# Patient Record
Sex: MALE | Race: WHITE | Employment: OTHER | URBAN - METROPOLITAN AREA
[De-identification: names, ages, dates, MRNs, and addresses within clinical notes are randomized per-mention and may not be internally consistent; named-entity substitution may affect disease eponyms.]

---

## 2017-01-18 ENCOUNTER — HOSPITAL ENCOUNTER (OUTPATIENT)
Dept: GENERAL RADIOLOGY | Age: 72
Discharge: HOME | End: 2017-01-18
Attending: FAMILY MEDICINE
Payer: MEDICARE

## 2017-01-18 DIAGNOSIS — R05.9 COUGH: ICD-10-CM

## 2017-01-18 DIAGNOSIS — R53.83 FATIGUE: ICD-10-CM

## 2017-01-18 PROCEDURE — 71020 XR CHEST PA + LAT CHEST (CPT=71020): CPT

## 2017-01-25 ENCOUNTER — TELEPHONE (OUTPATIENT)
Dept: FAMILY MEDICINE CLINIC | Facility: CLINIC | Age: 72
End: 2017-01-25

## 2017-01-25 NOTE — TELEPHONE ENCOUNTER
Informed Pt of Dr. Cris Houser phone number. Pt will schedule appt.  Christopher Martinez, 01/25/2017, 3:12 PM

## 2017-02-07 ENCOUNTER — TELEPHONE (OUTPATIENT)
Dept: FAMILY MEDICINE CLINIC | Facility: CLINIC | Age: 72
End: 2017-02-07

## 2017-02-07 PROBLEM — R53.81 GENERAL BODY DETERIORATION: Status: ACTIVE | Noted: 2017-01-18

## 2017-02-07 PROBLEM — R05.9 COUGH: Status: ACTIVE | Noted: 2017-01-18

## 2017-02-07 PROBLEM — R22.2 MASS IN CHEST: Status: ACTIVE | Noted: 2017-01-19

## 2017-02-07 RX ORDER — DILTIAZEM HYDROCHLORIDE 180 MG/1
1 CAPSULE, COATED, EXTENDED RELEASE ORAL DAILY
COMMUNITY
Start: 2015-06-09 | End: 2017-06-28 | Stop reason: ALTCHOICE

## 2017-02-22 ENCOUNTER — TELEPHONE (OUTPATIENT)
Dept: FAMILY MEDICINE CLINIC | Facility: CLINIC | Age: 72
End: 2017-02-22

## 2017-02-22 RX ORDER — FLUOROURACIL 50 MG/G
1 CREAM TOPICAL AS DIRECTED
Refills: 1 | COMMUNITY
Start: 2017-02-16 | End: 2017-09-02 | Stop reason: ALTCHOICE

## 2017-06-28 ENCOUNTER — HOSPITAL ENCOUNTER (OUTPATIENT)
Dept: GENERAL RADIOLOGY | Age: 72
Discharge: HOME | End: 2017-06-28
Attending: FAMILY MEDICINE
Payer: MEDICARE

## 2017-06-28 ENCOUNTER — LAB ENCOUNTER (OUTPATIENT)
Dept: LAB | Age: 72
End: 2017-06-28
Attending: FAMILY MEDICINE
Payer: MEDICARE

## 2017-06-28 ENCOUNTER — OFFICE VISIT (OUTPATIENT)
Dept: FAMILY MEDICINE CLINIC | Facility: CLINIC | Age: 72
End: 2017-06-28

## 2017-06-28 VITALS
BODY MASS INDEX: 26.74 KG/M2 | DIASTOLIC BLOOD PRESSURE: 74 MMHG | HEIGHT: 68.5 IN | RESPIRATION RATE: 16 BRPM | HEART RATE: 80 BPM | TEMPERATURE: 98 F | SYSTOLIC BLOOD PRESSURE: 134 MMHG | WEIGHT: 178.5 LBS

## 2017-06-28 DIAGNOSIS — R91.1 LUNG NODULE: ICD-10-CM

## 2017-06-28 DIAGNOSIS — I35.9 AORTIC VALVE DISORDER: ICD-10-CM

## 2017-06-28 DIAGNOSIS — J98.4 SINGLE CYST OF LUNG: ICD-10-CM

## 2017-06-28 DIAGNOSIS — K40.91 UNILATERAL RECURRENT INGUINAL HERNIA WITHOUT OBSTRUCTION OR GANGRENE: ICD-10-CM

## 2017-06-28 DIAGNOSIS — M15.9 PRIMARY OSTEOARTHRITIS INVOLVING MULTIPLE JOINTS: ICD-10-CM

## 2017-06-28 DIAGNOSIS — C43.9 MELANOMA OF SKIN (HCC): ICD-10-CM

## 2017-06-28 DIAGNOSIS — Z98.890 STATUS POST MITRAL VALVE REPAIR: ICD-10-CM

## 2017-06-28 DIAGNOSIS — N40.1 BENIGN NON-NODULAR PROSTATIC HYPERPLASIA WITH LOWER URINARY TRACT SYMPTOMS: ICD-10-CM

## 2017-06-28 DIAGNOSIS — I48.0 PAROXYSMAL ATRIAL FIBRILLATION (HCC): ICD-10-CM

## 2017-06-28 DIAGNOSIS — Z00.00 ENCOUNTER FOR ANNUAL HEALTH EXAMINATION: Primary | ICD-10-CM

## 2017-06-28 PROBLEM — M19.90 OSTEOARTHRITIS: Status: ACTIVE | Noted: 2017-06-28

## 2017-06-28 PROCEDURE — G0439 PPPS, SUBSEQ VISIT: HCPCS | Performed by: FAMILY MEDICINE

## 2017-06-28 PROCEDURE — 71020 XR CHEST PA + LAT CHEST (CPT=71020): CPT | Performed by: FAMILY MEDICINE

## 2017-06-28 PROCEDURE — 84550 ASSAY OF BLOOD/URIC ACID: CPT

## 2017-06-28 PROCEDURE — 80053 COMPREHEN METABOLIC PANEL: CPT

## 2017-06-28 PROCEDURE — 36415 COLL VENOUS BLD VENIPUNCTURE: CPT

## 2017-06-28 PROCEDURE — 85025 COMPLETE CBC W/AUTO DIFF WBC: CPT

## 2017-06-28 PROCEDURE — 84443 ASSAY THYROID STIM HORMONE: CPT

## 2017-06-28 PROCEDURE — 99214 OFFICE O/P EST MOD 30 MIN: CPT | Performed by: FAMILY MEDICINE

## 2017-06-28 RX ORDER — METOPROLOL SUCCINATE 25 MG/1
25 TABLET, EXTENDED RELEASE ORAL 2 TIMES DAILY
COMMUNITY
Start: 2017-06-02 | End: 2019-12-11

## 2017-06-28 NOTE — PROGRESS NOTES
[facility] [place]  PROGRESS NOTE  Chief Complaint:   Patient presents with:  Physical      HPI:   This is a 66year old male coming in for his annual Medicare wellness exam.    He notes that he just had more muscle weakness that he had before. Rivaroxaban (XARELTO) 20 MG Oral Tab Take 1 tablet by mouth daily. Disp:  Rfl:       Counseling given: Not Answered       REVIEW OF SYSTEMS:   CONSTITUTIONAL: His weight is not changed. He has not had any fever or chills.   He just has diffuse muscle wea apparent distress. HEENT:  Head:  Normocephalic, atraumatic Eyes: EOMI, PERRLA, no scleral icterus, conjunctivae clear bilaterally, no eye discharge Ears: External normal. Nose: patent, no nasal discharge Throat:  No tonsillar erythema or exudate.   Mouth: labs today including a CBC.  - CBC WITH DIFFERENTIAL WITH PLATELET; Future  - COMP METABOLIC PANEL (14); Future  - ASSAY, THYROID STIM HORMONE; Future  - URIC ACID, SERUM; Future    5.  Unilateral recurrent inguinal hernia without obstruction or gangrene  T Elevated prostate specific antigen (PSA)     General body deterioration     Routine general medical examination at a health care facility     Inguinal hernia     Mass in chest     Lung nodule     Melanoma of skin ([facility] [address]     Mitral valve disorder     Special

## 2017-06-28 NOTE — PATIENT INSTRUCTIONS
Recommended Websites for Advanced Directives    [email or web address].no. org/publications/Documents/personal_dec. pdf  An information packet, including necessary form from the [address] website. http://www. [place]state. [place]/public/books/adv

## 2017-06-30 ENCOUNTER — TELEPHONE (OUTPATIENT)
Dept: FAMILY MEDICINE CLINIC | Facility: CLINIC | Age: 72
End: 2017-06-30

## 2017-06-30 NOTE — TELEPHONE ENCOUNTER
----- Message from Nikita Lawler sent at 6/30/2017  3:33 PM CDT -----  R/C    Please call [phone].

## 2017-06-30 NOTE — TELEPHONE ENCOUNTER
----- Message from Cherri Fregoso MD sent at 6/30/2017 12:53 PM CDT -----  Please call Wallace Jeans. His labs look good. His blood sugar is normal.  His thyroid is normal.   His uric acid is normal.. His blood count is normal.  This is all good news.

## 2017-06-30 NOTE — TELEPHONE ENCOUNTER
Patient informed of below. Expressed understanding. Requesting copy of CXR/CT Scan and Labs left at  for .   Deandre Blevins, 06/30/17, 4:03 PM

## 2017-06-30 NOTE — TELEPHONE ENCOUNTER
Sorry - did not mention that in my note. The chest x-ray shows that the cyst has grown some since the last chest x-ray. Next step is to have the follow-up CT done with Dr Yelena Roy next month.

## 2017-06-30 NOTE — TELEPHONE ENCOUNTER
Informed of this blood work results. Pt expressed understanding and thanks. Also pt would like to know if Dr. Raphael Tafoya compared the chest xray from Wednesday to 5 months ago. Please advise.

## 2017-09-02 ENCOUNTER — OFFICE VISIT (OUTPATIENT)
Dept: FAMILY MEDICINE CLINIC | Facility: CLINIC | Age: 72
End: 2017-09-02

## 2017-09-02 VITALS
TEMPERATURE: 98 F | SYSTOLIC BLOOD PRESSURE: 138 MMHG | WEIGHT: 181.38 LBS | BODY MASS INDEX: 27.17 KG/M2 | DIASTOLIC BLOOD PRESSURE: 86 MMHG | RESPIRATION RATE: 16 BRPM | HEART RATE: 78 BPM | HEIGHT: 68.5 IN

## 2017-09-02 DIAGNOSIS — R35.0 URINARY FREQUENCY: ICD-10-CM

## 2017-09-02 DIAGNOSIS — N30.01 ACUTE CYSTITIS WITH HEMATURIA: Primary | ICD-10-CM

## 2017-09-02 LAB
BILIRUB UR QL STRIP.AUTO: NEGATIVE
BILIRUBIN: NEGATIVE
CLARITY UR REFRACT.AUTO: CLEAR
COLOR UR AUTO: YELLOW
GLUCOSE (URINE DIPSTICK): NEGATIVE MG/DL
GLUCOSE UR STRIP.AUTO-MCNC: NEGATIVE MG/DL
KETONES (URINE DIPSTICK): NEGATIVE MG/DL
KETONES UR STRIP.AUTO-MCNC: NEGATIVE MG/DL
MULTISTIX LOT#: NORMAL NUMERIC
NITRITE UR QL STRIP.AUTO: NEGATIVE
NITRITE, URINE: NEGATIVE
PH UR STRIP.AUTO: 6 [PH] (ref 4.5–8)
PH, URINE: 7 (ref 4.5–8)
PROT UR STRIP.AUTO-MCNC: NEGATIVE MG/DL
PROTEIN (URINE DIPSTICK): NEGATIVE MG/DL
RBC UR QL AUTO: NEGATIVE
SP GR UR STRIP.AUTO: 1.01 (ref 1–1.03)
SPECIFIC GRAVITY: 1.02 (ref 1–1.03)
UROBILINOGEN UR STRIP.AUTO-MCNC: <2 MG/DL
UROBILINOGEN,SEMI-QN: 0.2 MG/DL (ref 0–1.9)

## 2017-09-02 PROCEDURE — 81003 URINALYSIS AUTO W/O SCOPE: CPT | Performed by: FAMILY MEDICINE

## 2017-09-02 PROCEDURE — 87186 SC STD MICRODIL/AGAR DIL: CPT | Performed by: FAMILY MEDICINE

## 2017-09-02 PROCEDURE — 87086 URINE CULTURE/COLONY COUNT: CPT | Performed by: FAMILY MEDICINE

## 2017-09-02 PROCEDURE — 99214 OFFICE O/P EST MOD 30 MIN: CPT | Performed by: FAMILY MEDICINE

## 2017-09-02 PROCEDURE — 81001 URINALYSIS AUTO W/SCOPE: CPT | Performed by: FAMILY MEDICINE

## 2017-09-02 PROCEDURE — 87077 CULTURE AEROBIC IDENTIFY: CPT | Performed by: FAMILY MEDICINE

## 2017-09-02 RX ORDER — CEPHALEXIN 500 MG/1
500 CAPSULE ORAL 2 TIMES DAILY
Qty: 20 CAPSULE | Refills: 0 | Status: SHIPPED | OUTPATIENT
Start: 2017-09-02 | End: 2017-09-07 | Stop reason: ALTCHOICE

## 2017-09-02 NOTE — PROGRESS NOTES
Chief Complaint:   Patient presents with:  Urinary Frequency: Has been getting worse, also last night patient noticed he had discharge      HPI:   This is a 74year old male coming in for complaints of increase urinary frequency.   Occasional discomfort as Mother is . She  of stroke at the age of [de-identified]. The patient indicates family history of lung cancer (father), heart disease (mother), stroke (mother), coronary artery disease (mother).    Sister  suddenly age 80 - MI  Sister alive age [de-identified] - kg/m² as calculated from the following:    Height as of this encounter: 68.5\". Weight as of this encounter: 181 lb 6 oz. Vital signs reviewed. Appears stated age, well groomed.     Physical Exam:    GEN:  Patient is alert, awake and oriented, well deve lesion     Cough     Elevated prostate specific antigen (PSA)     General body deterioration     Routine general medical examination at a health care facility     Inguinal hernia     Mass in chest     Lung nodule     Melanoma of skin (HCC)     Mitral valve

## 2017-09-07 ENCOUNTER — TELEPHONE (OUTPATIENT)
Dept: FAMILY MEDICINE CLINIC | Facility: CLINIC | Age: 72
End: 2017-09-07

## 2017-09-07 RX ORDER — CIPROFLOXACIN 500 MG/1
500 TABLET, FILM COATED ORAL 2 TIMES DAILY
Qty: 20 TABLET | Refills: 0 | Status: SHIPPED | OUTPATIENT
Start: 2017-09-07 | End: 2017-09-17

## 2017-09-07 NOTE — TELEPHONE ENCOUNTER
----- Message from HAMIDA Alberto sent at 9/7/2017  8:24 AM CDT -----  Dr. Emiliano Moss patient–please notify patient that his urine culture shows that his infection is resistant to his current antibiotic Keflex (cephalexin) patient should stop that

## 2018-01-31 ENCOUNTER — OFFICE VISIT (OUTPATIENT)
Dept: FAMILY MEDICINE CLINIC | Facility: CLINIC | Age: 73
End: 2018-01-31

## 2018-01-31 VITALS
BODY MASS INDEX: 27.47 KG/M2 | HEIGHT: 68.5 IN | WEIGHT: 183.38 LBS | TEMPERATURE: 99 F | HEART RATE: 82 BPM | RESPIRATION RATE: 16 BRPM | DIASTOLIC BLOOD PRESSURE: 82 MMHG | SYSTOLIC BLOOD PRESSURE: 142 MMHG | OXYGEN SATURATION: 95 %

## 2018-01-31 DIAGNOSIS — J40 BRONCHITIS: ICD-10-CM

## 2018-01-31 DIAGNOSIS — R05.9 COUGH: Primary | ICD-10-CM

## 2018-01-31 PROCEDURE — 99213 OFFICE O/P EST LOW 20 MIN: CPT | Performed by: FAMILY MEDICINE

## 2018-01-31 RX ORDER — ACETAMINOPHEN 325 MG/1
325 TABLET ORAL EVERY 4 HOURS PRN
COMMUNITY

## 2018-01-31 RX ORDER — AMOXICILLIN AND CLAVULANATE POTASSIUM 875; 125 MG/1; MG/1
1 TABLET, FILM COATED ORAL 2 TIMES DAILY
Qty: 20 TABLET | Refills: 0 | Status: SHIPPED | OUTPATIENT
Start: 2018-01-31 | End: 2018-02-10

## 2018-01-31 RX ORDER — DRONEDARONE 400 MG/1
1 TABLET, FILM COATED ORAL 2 TIMES DAILY WITH MEALS
COMMUNITY
Start: 2018-01-04 | End: 2018-10-17

## 2018-01-31 NOTE — PROGRESS NOTES
[facility] [place]  PROGRESS NOTE  Chief Complaint:   Patient presents with:  Cough  Fever  Headache      HPI:   This is a 74year old male coming in for cough, fever, and headache. He has been feeling ill now for about 4 days.   When he coughs Morganella morganii (A)    *Culture results found, see result in Chart Review         Past Medical History:   Diagnosis Date   • Anxiety    • Aortic regurgitation    • Arthritis    • Atrial fibrillation ([facility] [address] 2014   • BPH (benign prostatic hyperplasia) He has a runny nose and a lot of congestion. INTEGUMENTARY:  Denies rashes, itching, skin lesion, or excessive skin dryness.   CARDIOVASCULAR:  Denies chest pain, chest pressure, chest discomfort, palpitations, edema, dyspnea on exertion or at rest.  RESPI Regular rate and rhythm, no murmurs, rubs or gallops. LUNGS: Clear to auscultation bilterally, no rales/rhonchi/wheezing. ABDOMEN:  Soft, nondistended, nontender, bowel sounds normal in all 4 quadrants, no masses, no hepatosplenomegaly.       ASSESSMENT A

## 2018-02-02 ENCOUNTER — TELEPHONE (OUTPATIENT)
Dept: FAMILY MEDICINE CLINIC | Facility: CLINIC | Age: 73
End: 2018-02-02

## 2018-02-02 RX ORDER — FLUTICASONE PROPIONATE AND SALMETEROL 250; 50 UG/1; UG/1
1 POWDER RESPIRATORY (INHALATION) EVERY 12 HOURS SCHEDULED
Qty: 60 EACH | Refills: 5 | Status: SHIPPED | OUTPATIENT
Start: 2018-02-02 | End: 2018-07-27

## 2018-02-02 NOTE — TELEPHONE ENCOUNTER
I recommend that we add an inhaler to see if that will be helpful. I will send in a prescription for Advair. Please use 1 puff twice a day for the next 2 weeks. Let me know within the next 2-3 days how this is working.

## 2018-02-02 NOTE — TELEPHONE ENCOUNTER
Patient states He continues with Augmentin. States Cough is persistant. No fever. Feels slightly better. Cough is very bothersome. Voice hoarse. Please advise.   Emanuel Suazo, 02/02/18, 1:22 PM

## 2018-02-05 ENCOUNTER — TELEPHONE (OUTPATIENT)
Dept: FAMILY MEDICINE CLINIC | Facility: CLINIC | Age: 73
End: 2018-02-05

## 2018-02-05 ENCOUNTER — OFFICE VISIT (OUTPATIENT)
Dept: FAMILY MEDICINE CLINIC | Facility: CLINIC | Age: 73
End: 2018-02-05

## 2018-02-05 VITALS
WEIGHT: 180.63 LBS | SYSTOLIC BLOOD PRESSURE: 126 MMHG | HEART RATE: 72 BPM | RESPIRATION RATE: 16 BRPM | DIASTOLIC BLOOD PRESSURE: 78 MMHG | HEIGHT: 69 IN | BODY MASS INDEX: 26.75 KG/M2 | TEMPERATURE: 98 F

## 2018-02-05 DIAGNOSIS — J40 BRONCHITIS: ICD-10-CM

## 2018-02-05 DIAGNOSIS — R05.9 COUGH: Primary | ICD-10-CM

## 2018-02-05 PROCEDURE — 99213 OFFICE O/P EST LOW 20 MIN: CPT | Performed by: FAMILY MEDICINE

## 2018-02-05 RX ORDER — BENZONATATE 100 MG/1
100 CAPSULE ORAL 3 TIMES DAILY PRN
Qty: 30 CAPSULE | Refills: 5 | Status: SHIPPED | OUTPATIENT
Start: 2018-02-05 | End: 2018-07-27 | Stop reason: ALTCHOICE

## 2018-02-05 NOTE — TELEPHONE ENCOUNTER
Patient states cough is not better with Advair. Appt given 3:30 Today with Dr Patito Grady for recheck of cough.   Carlos Enrique Campbell, 02/05/18, 9:47 AM

## 2018-02-05 NOTE — PROGRESS NOTES
[address]  PROGRESS NOTE  Chief Complaint:   Patient presents with: Follow - Up: Cough      HPI:   This is a 74year old male coming in for follow-up on his cough. He said he feels much better than he did 6 days ago.   He no longer has pH Urine 6.0 4.5 - 8.0   Protein Urine Negative Negative mg/dl   Urobilinogen Urine <2.0 0.2 - 2.0 mg/dL   Nitrite Urine Negative Negative   Leukocyte Esterase Urine Moderate (A) Negative   WBC Urine 5-10 (A) <5 /HPF   RBC URINE 0-2 0 - 2 /HPF   Bacteria into the lungs every 12 (twelve) hours. Disp: 60 each Rfl: 5   MULTAQ 400 MG Oral Tab Take 1 tablet by mouth daily. Disp:  Rfl:    acetaminophen 325 MG Oral Tab Take 325 mg by mouth every 4 (four) hours as needed for Pain.  Disp:  Rfl:    Amoxicillin-Pot Cl sneezing, hives, eczema or rhinitis.      EXAM:   /78 (BP Location: Right arm, Patient Position: Sitting, Cuff Size: adult)   Pulse 72   Temp 98.4 °F (36.9 °C) (Tympanic)   Resp 16   Ht 69\"   Wt 180 lb 9.6 oz   BMI 26.67 kg/m²  Estimated body mass in needed for cough. Patient/Caregiver Education: Patient/Caregiver Education: There are no barriers to learning. Medical education done. Outcome: Patient verbalizes understanding.  Patient is notified to call with any questions, complications, all

## 2018-06-29 ENCOUNTER — OFFICE VISIT (OUTPATIENT)
Dept: FAMILY MEDICINE CLINIC | Facility: CLINIC | Age: 73
End: 2018-06-29

## 2018-06-29 VITALS
TEMPERATURE: 98 F | RESPIRATION RATE: 16 BRPM | BODY MASS INDEX: 27.05 KG/M2 | DIASTOLIC BLOOD PRESSURE: 68 MMHG | WEIGHT: 182.63 LBS | SYSTOLIC BLOOD PRESSURE: 100 MMHG | HEIGHT: 69 IN | HEART RATE: 100 BPM

## 2018-06-29 DIAGNOSIS — I35.9 AORTIC VALVE DISORDER: ICD-10-CM

## 2018-06-29 DIAGNOSIS — R35.1 BENIGN PROSTATIC HYPERPLASIA WITH NOCTURIA: ICD-10-CM

## 2018-06-29 DIAGNOSIS — Z00.00 ENCOUNTER FOR ANNUAL HEALTH EXAMINATION: Primary | ICD-10-CM

## 2018-06-29 DIAGNOSIS — J98.4 SINGLE CYST OF LUNG: ICD-10-CM

## 2018-06-29 DIAGNOSIS — I48.21 PERMANENT ATRIAL FIBRILLATION (HCC): ICD-10-CM

## 2018-06-29 DIAGNOSIS — I05.9 MITRAL VALVE DISEASE: ICD-10-CM

## 2018-06-29 DIAGNOSIS — K40.91 UNILATERAL RECURRENT INGUINAL HERNIA WITHOUT OBSTRUCTION OR GANGRENE: ICD-10-CM

## 2018-06-29 DIAGNOSIS — N40.1 BENIGN PROSTATIC HYPERPLASIA WITH NOCTURIA: ICD-10-CM

## 2018-06-29 DIAGNOSIS — M15.9 PRIMARY OSTEOARTHRITIS INVOLVING MULTIPLE JOINTS: ICD-10-CM

## 2018-06-29 PROCEDURE — 99214 OFFICE O/P EST MOD 30 MIN: CPT | Performed by: FAMILY MEDICINE

## 2018-06-29 PROCEDURE — G0439 PPPS, SUBSEQ VISIT: HCPCS | Performed by: FAMILY MEDICINE

## 2018-06-29 RX ORDER — ALBUTEROL SULFATE 90 UG/1
1 AEROSOL, METERED RESPIRATORY (INHALATION) EVERY 4 HOURS PRN
COMMUNITY
Start: 2018-02-16 | End: 2018-07-27

## 2018-06-29 NOTE — PATIENT INSTRUCTIONS
Medially clear for surgery. Recommended Websites for Advanced Directives    SeekAlumni.no. org/publications/Documents/personal_dec. pdf  An information packet, including necessary form from the [employer] website.      http://

## 2018-06-29 NOTE — PROGRESS NOTES
[facility] [place]  PROGRESS NOTE  Chief Complaint:   Patient presents with:  Physical      HPI:   This is a 74year old male coming in for his annual Medicare wellness exam.  What he is noticed is that his ability to do work is less than it was morganii (A)    *Culture results found, see result in Chart Review         Past Medical History:   Diagnosis Date   • Anxiety    • Aortic regurgitation    • Arthritis    • Atrial fibrillation ([facility] [address] 2014   • BPH (benign prostatic hyperplasia)    • Cancer (H (twelve) hours. Disp: 60 each Rfl: 5   Dextromethorphan HBr (VICKS DAYQUIL COUGH) 15 MG/15ML Oral Liquid Take 15 mL by mouth 2 (two) times daily as needed.  Disp:  Rfl:       Counseling given: Not Answered       REVIEW OF SYSTEMS:   CONSTITUTIONAL: He is fe and oriented, well developed, well nourished, no apparent distress.   HEENT:  Head:  Normocephalic, atraumatic Eyes: EOMI, PERRLA, no scleral icterus, conjunctivae clear bilaterally, no eye discharge Ears: External normal. Nose: patent, no nasal discharge T recurrent inguinal hernia without obstruction or gangrene  His recurrent inguinal hernia unfortunately and will have it corrected later this summer.  - OFFICE/OUTPT VISIT,EST,LEVL IV    6.  Primary osteoarthritis involving multiple joints  He has osteoarthr

## 2018-07-19 ENCOUNTER — TELEPHONE (OUTPATIENT)
Dept: FAMILY MEDICINE CLINIC | Facility: CLINIC | Age: 73
End: 2018-07-19

## 2018-07-19 DIAGNOSIS — N32.0 BLADDER OUTLET OBSTRUCTION: Primary | ICD-10-CM

## 2018-07-19 NOTE — TELEPHONE ENCOUNTER
Pt needs referral for Urologist.    According to pt he needs to see Urologist with 5 days due to a cathter in.      Please place referral.

## 2018-07-20 ENCOUNTER — TELEPHONE (OUTPATIENT)
Dept: FAMILY MEDICINE CLINIC | Facility: CLINIC | Age: 73
End: 2018-07-20

## 2018-07-20 NOTE — TELEPHONE ENCOUNTER
I will send in a referral to Dr Evan Raymond. If he only needs to have the Hernandez removed, we can do that here. There is a long wait to get in to see Dr Evan Raymond.

## 2018-07-20 NOTE — TELEPHONE ENCOUNTER
Referral should have unlimited visits. John Simmons called.   Questions her # is [phone] [id number]

## 2018-07-20 NOTE — TELEPHONE ENCOUNTER
I called and spoke with Ashely Gaines. He had a 5 pound tumor removed from his lung at [facility].  Postoperatively he developed atrial fibrillation. He also developed acute bladder outlet obstruction due to an enlarged prostate.   During the course

## 2018-07-20 NOTE — TELEPHONE ENCOUNTER
Referal refaxed with unlimited visits written in with Dr Fermin Waterman signature.   Amanda Murphy, 07/20/18, 1:34 PM

## 2018-07-20 NOTE — TELEPHONE ENCOUNTER
Patient states he has some pink discharge on his underwear. States He is uncertain if this is normal with a campuzano cath and if so what is causing   This discoloration? Patient states He is wearing boxer underwear.   Lien Keith, 07/20/18, 11:51 AM

## 2018-07-20 NOTE — TELEPHONE ENCOUNTER
Patient informed Referal faxed to [facility] and can call this afternoon for Appt/agreed. Patient is uncertain if Appt with  Urologist is for campuzano cath removal only. Will keep Urology Appt.   Kacie Cross, 07/20/18, 11:45 AM

## 2018-07-27 ENCOUNTER — OFFICE VISIT (OUTPATIENT)
Dept: FAMILY MEDICINE CLINIC | Facility: CLINIC | Age: 73
End: 2018-07-27
Payer: MEDICARE

## 2018-07-27 VITALS
WEIGHT: 180.19 LBS | SYSTOLIC BLOOD PRESSURE: 100 MMHG | RESPIRATION RATE: 18 BRPM | HEIGHT: 69 IN | HEART RATE: 88 BPM | TEMPERATURE: 100 F | BODY MASS INDEX: 26.69 KG/M2 | DIASTOLIC BLOOD PRESSURE: 64 MMHG

## 2018-07-27 DIAGNOSIS — I48.21 PERMANENT ATRIAL FIBRILLATION (HCC): ICD-10-CM

## 2018-07-27 DIAGNOSIS — R35.1 BENIGN PROSTATIC HYPERPLASIA WITH NOCTURIA: ICD-10-CM

## 2018-07-27 DIAGNOSIS — Z90.2 S/P LOBECTOMY OF LUNG: ICD-10-CM

## 2018-07-27 DIAGNOSIS — R05.9 COUGH: ICD-10-CM

## 2018-07-27 DIAGNOSIS — N40.1 BENIGN PROSTATIC HYPERPLASIA WITH NOCTURIA: ICD-10-CM

## 2018-07-27 DIAGNOSIS — R22.2 MASS IN CHEST: Primary | ICD-10-CM

## 2018-07-27 PROCEDURE — 99214 OFFICE O/P EST MOD 30 MIN: CPT | Performed by: FAMILY MEDICINE

## 2018-07-27 PROCEDURE — 1111F DSCHRG MED/CURRENT MED MERGE: CPT | Performed by: FAMILY MEDICINE

## 2018-07-27 RX ORDER — TAMSULOSIN HYDROCHLORIDE 0.4 MG/1
1 CAPSULE ORAL DAILY
COMMUNITY
Start: 2018-07-19 | End: 2018-10-23

## 2018-07-27 RX ORDER — GABAPENTIN 300 MG/1
300 CAPSULE ORAL DAILY
COMMUNITY
Start: 2018-07-19 | End: 2018-10-17

## 2018-07-27 RX ORDER — DILTIAZEM HYDROCHLORIDE 240 MG/1
240 CAPSULE, COATED, EXTENDED RELEASE ORAL DAILY
COMMUNITY
Start: 2018-07-20 | End: 2018-11-19 | Stop reason: DRUGHIGH

## 2018-07-27 RX ORDER — LIDOCAINE 50 MG/G
1 PATCH TOPICAL DAILY
COMMUNITY
Start: 2018-07-20 | End: 2018-10-17

## 2018-07-27 RX ORDER — BUDESONIDE AND FORMOTEROL FUMARATE DIHYDRATE 160; 4.5 UG/1; UG/1
2 AEROSOL RESPIRATORY (INHALATION) 2 TIMES DAILY
COMMUNITY
Start: 2018-07-19 | End: 2018-11-19

## 2018-07-27 RX ORDER — AMOXICILLIN AND CLAVULANATE POTASSIUM 875; 125 MG/1; MG/1
1 TABLET, FILM COATED ORAL 2 TIMES DAILY
COMMUNITY
Start: 2018-07-24 | End: 2018-07-29

## 2018-07-27 NOTE — PROGRESS NOTES
[facility] [place]  PROGRESS NOTE  Chief Complaint:   Patient presents with:  Hospital F/U      HPI:   This is a 74year old male coming in for follow-up on his surgery.   He was admitted to [facility] on July 12 and had removal of h Date   -URINALYSIS, ROUTINE   Result Value Ref Range   Urine Color Yellow Yellow   Clarity Urine Clear Clear   Spec Gravity 1.015 1.001 - 1.030   Glucose Urine Negative Negative mg/dl   Bilirubin Urine Negative Negative   Ketones Urine Negative Negative mg Outpatient Prescriptions:  Amoxicillin-Pot Clavulanate 875-125 MG Oral Tab Take 1 tablet by mouth 2 (two) times daily.  Disp:  Rfl:    SYMBICORT 160-4.5 MCG/ACT Inhalation Aerosol  Disp:  Rfl:    DilTIAZem HCl ER Coated Beads 240 MG Oral Capsule SR 24 Hr Ta enlarged nodes or history of splenectomy. PSYCHIATRIC:  Denies depression or anxiety. ENDOCRINOLOGIC:  Denies excessive sweating, cold or heat intolerance, polyuria or polydipsia.   ALLERGIES:  Denies allergic response, history of asthma, sneezing, hives, tumor.  There was no evidence for malignancy. He does not require any chemotherapy or additional treatment. 2. S/P lobectomy of lung  He is status post left lower lobectomy and recovering well.     3. Benign prostatic hyperplasia with nocturia  His pros

## 2018-08-10 ENCOUNTER — TELEPHONE (OUTPATIENT)
Dept: FAMILY MEDICINE CLINIC | Facility: CLINIC | Age: 73
End: 2018-08-10

## 2018-08-10 NOTE — TELEPHONE ENCOUNTER
new resident   to them  faxing over physician certification papers that need to be sent back to them TODAY

## 2018-08-13 ENCOUNTER — TELEPHONE (OUTPATIENT)
Dept: FAMILY MEDICINE CLINIC | Facility: CLINIC | Age: 73
End: 2018-08-13

## 2018-08-13 RX ORDER — HYDROCODONE BITARTRATE AND ACETAMINOPHEN 5; 325 MG/1; MG/1
1 TABLET ORAL EVERY 6 HOURS PRN
Qty: 12 TABLET | Refills: 0 | Status: SHIPPED | OUTPATIENT
Start: 2018-08-13 | End: 2018-11-19 | Stop reason: ALTCHOICE

## 2018-08-13 NOTE — TELEPHONE ENCOUNTER
Mrs Ronaldo Tillman wanting Patient discharged from [facility]. States Patient is not doing as well there as when He was in the hospital prior to discharge. Patient receiving IV Antibiotics TID and has a chest tube.     I phoned [facility] Wolf states they can t

## 2018-08-13 NOTE — TELEPHONE ENCOUNTER
Recent admit to [facility]. Requesting refill Norco.  Please advise.   Ivania Lowry, 08/13/18, 1:43 PM

## 2018-08-14 ENCOUNTER — TELEPHONE (OUTPATIENT)
Dept: FAMILY MEDICINE CLINIC | Facility: CLINIC | Age: 73
End: 2018-08-14

## 2018-08-14 DIAGNOSIS — A41.9 SEPSIS, DUE TO UNSPECIFIED ORGANISM: ICD-10-CM

## 2018-08-14 DIAGNOSIS — Z90.2 S/P LOBECTOMY OF LUNG: ICD-10-CM

## 2018-08-14 DIAGNOSIS — N17.0 ACUTE KIDNEY FAILURE WITH LESION OF TUBULAR NECROSIS (HCC): ICD-10-CM

## 2018-08-14 DIAGNOSIS — J93.11 PRIMARY SPONTANEOUS PNEUMOTHORAX: ICD-10-CM

## 2018-08-14 DIAGNOSIS — J98.2 PNEUMOMEDIASTINUM (HCC): ICD-10-CM

## 2018-08-14 DIAGNOSIS — I48.21 PERMANENT ATRIAL FIBRILLATION (HCC): Primary | ICD-10-CM

## 2018-08-14 DIAGNOSIS — R53.81 GENERAL BODY DETERIORATION: ICD-10-CM

## 2018-08-14 LAB
ALBUMIN: 2.5 G/DL
ALKALINE PHOSPHATASE, SERUM: 55 IU/L
ALT (SGPT): 60 IU/L
AST (SGOT): 39 IU/L
BILIRUBIN, TOTAL: 0.57 MG/DL
BUN: 35
CALCIUM, SERUM: 8 MG/DL
CHLORIDE, SERUM: 105
CO2: 25
CREATINE, SERUM: 2.16 MG/DL
EGFR IF NONAFRICN AM: 30
GLUCOSE, SERUM: 99 MG/DL
HCT: 27.2
HGB: 8.6 G/DL
MCH: 30.1 PG
MCHC: 31.6 G/DL
MCV: 95.1 FL
MPV: 9.4
PLATELET COUNT: 466
POTASSIUM, SERUM: 4.1
PROTEIN, TOTAL, SERUM: 5.2 G/DL
RBC: 2.86 /HPF
RDW-CV: 15.3
SODIUM, SERUM: 141
WBC: 15.92 /HPF

## 2018-08-14 NOTE — TELEPHONE ENCOUNTER
I called and spoke with Mike Jones. She is adamant that Vernon Champion has to return home as soon as possible. She feels that he has gone downhill dramatically in the 5 days that he has been at [facility].   She said that he needs to come home so that she can ta

## 2018-08-14 NOTE — TELEPHONE ENCOUNTER
Please call Antonia. His kidney function is actually slightly improved. His hemoglobin was down to 8.1 and is now up to 8.6. This is good news. His labs are slightly improved.

## 2018-08-14 NOTE — TELEPHONE ENCOUNTER
Phone Call from [address] [facility]. Robert Keenan has spoken with Mrs Malgorzata Chavis. Does not feel that Patient should be discharged due to safety issues. Has Chest Tube/Wound Vac/IV Antibiotics TID. States Patient is getting 2hrs of PT daily.   Has addressed Wifes

## 2018-08-14 NOTE — TELEPHONE ENCOUNTER
Requesting to speak with Dr Sae Westbrook. Mrs Kevin Alvarado wanting Patient out of [facility]. Does not feel Patient is getting the care He is there for. I did phone [facility] yesterday for Mrs Kevin Alvarado.   [facility] states they will train Mrs Kevin Alvarado to give

## 2018-09-04 ENCOUNTER — MED REC SCAN ONLY (OUTPATIENT)
Dept: FAMILY MEDICINE CLINIC | Facility: CLINIC | Age: 73
End: 2018-09-04

## 2018-09-12 ENCOUNTER — MED REC SCAN ONLY (OUTPATIENT)
Dept: FAMILY MEDICINE CLINIC | Facility: CLINIC | Age: 73
End: 2018-09-12

## 2018-10-03 ENCOUNTER — MED REC SCAN ONLY (OUTPATIENT)
Dept: FAMILY MEDICINE CLINIC | Facility: CLINIC | Age: 73
End: 2018-10-03

## 2018-10-10 ENCOUNTER — TELEPHONE (OUTPATIENT)
Dept: FAMILY MEDICINE CLINIC | Facility: CLINIC | Age: 73
End: 2018-10-10

## 2018-10-10 NOTE — TELEPHONE ENCOUNTER
patient is being dc'd today - is having a hard time getting orders from hospitalist - needs verbal order for home health nursing with Physical Therapy and a PT/ INR order for done on 10/12

## 2018-10-10 NOTE — TELEPHONE ENCOUNTER
Below faxed to Ratna Stiles at [facility]. [phone].   [place], 10/10/18, 5:33 PM

## 2018-10-17 ENCOUNTER — OFFICE VISIT (OUTPATIENT)
Dept: FAMILY MEDICINE CLINIC | Facility: CLINIC | Age: 73
End: 2018-10-17
Payer: MEDICARE

## 2018-10-17 ENCOUNTER — TELEPHONE (OUTPATIENT)
Dept: FAMILY MEDICINE CLINIC | Facility: CLINIC | Age: 73
End: 2018-10-17

## 2018-10-17 VITALS
RESPIRATION RATE: 18 BRPM | TEMPERATURE: 98 F | HEIGHT: 69 IN | SYSTOLIC BLOOD PRESSURE: 86 MMHG | BODY MASS INDEX: 24.64 KG/M2 | WEIGHT: 166.38 LBS | HEART RATE: 68 BPM | DIASTOLIC BLOOD PRESSURE: 50 MMHG

## 2018-10-17 DIAGNOSIS — N17.0 ACUTE KIDNEY FAILURE WITH LESION OF TUBULAR NECROSIS (HCC): ICD-10-CM

## 2018-10-17 DIAGNOSIS — I48.19 PERSISTENT ATRIAL FIBRILLATION (HCC): ICD-10-CM

## 2018-10-17 DIAGNOSIS — J86.9 EMPYEMA LUNG (HCC): ICD-10-CM

## 2018-10-17 DIAGNOSIS — D50.0 IRON DEFICIENCY ANEMIA DUE TO CHRONIC BLOOD LOSS: Primary | ICD-10-CM

## 2018-10-17 DIAGNOSIS — S36.029D SPLEEN HEMATOMA, SUBSEQUENT ENCOUNTER: ICD-10-CM

## 2018-10-17 PROBLEM — T45.515A WARFARIN-INDUCED COAGULOPATHY (HCC): Status: ACTIVE | Noted: 2018-09-22

## 2018-10-17 PROBLEM — N28.9 RENAL INSUFFICIENCY SYNDROME: Status: ACTIVE | Noted: 2018-08-15

## 2018-10-17 PROBLEM — S36.039A LACERATION OF SPLEEN: Status: ACTIVE | Noted: 2018-09-22

## 2018-10-17 PROBLEM — I95.9 HYPOTENSION: Status: ACTIVE | Noted: 2018-09-22

## 2018-10-17 PROBLEM — R10.9 ABDOMINAL PAIN: Status: ACTIVE | Noted: 2018-09-22

## 2018-10-17 PROBLEM — T45.515A WARFARIN-INDUCED COAGULOPATHY: Status: ACTIVE | Noted: 2018-09-22

## 2018-10-17 PROBLEM — D72.829 LEUKOCYTOSIS: Status: ACTIVE | Noted: 2018-08-15

## 2018-10-17 PROBLEM — S36.029A SPLEEN HEMATOMA: Status: ACTIVE | Noted: 2018-09-22

## 2018-10-17 PROBLEM — B35.6 TINEA CRURIS: Status: ACTIVE | Noted: 2018-09-27

## 2018-10-17 PROBLEM — D68.32 WARFARIN-INDUCED COAGULOPATHY (HCC): Status: ACTIVE | Noted: 2018-09-22

## 2018-10-17 PROBLEM — R50.9 FEVER: Status: ACTIVE | Noted: 2018-08-15

## 2018-10-17 PROBLEM — D68.32 WARFARIN-INDUCED COAGULOPATHY: Status: ACTIVE | Noted: 2018-09-22

## 2018-10-17 PROBLEM — I82.402 DEEP VEIN THROMBOSIS (DVT) OF LEFT LOWER EXTREMITY (HCC): Status: ACTIVE | Noted: 2018-09-23

## 2018-10-17 PROBLEM — D64.9 ANEMIA: Status: ACTIVE | Noted: 2018-09-22

## 2018-10-17 PROBLEM — Z79.01 LONG TERM CURRENT USE OF ANTICOAGULANT THERAPY: Status: ACTIVE | Noted: 2018-08-24

## 2018-10-17 PROBLEM — D62 ACUTE POSTHEMORRHAGIC ANEMIA: Status: ACTIVE | Noted: 2018-09-22

## 2018-10-17 PROBLEM — R07.9 CHEST PAIN: Status: ACTIVE | Noted: 2018-09-22

## 2018-10-17 PROCEDURE — 99214 OFFICE O/P EST MOD 30 MIN: CPT | Performed by: FAMILY MEDICINE

## 2018-10-17 PROCEDURE — 1111F DSCHRG MED/CURRENT MED MERGE: CPT | Performed by: FAMILY MEDICINE

## 2018-10-17 RX ORDER — MELATONIN
325
COMMUNITY
End: 2019-02-01

## 2018-10-17 RX ORDER — NYSTATIN 100000 [USP'U]/G
POWDER TOPICAL
Qty: 45 G | Refills: 3 | Status: SHIPPED | OUTPATIENT
Start: 2018-10-17 | End: 2018-11-19 | Stop reason: ALTCHOICE

## 2018-10-17 RX ORDER — IBUPROFEN 800 MG
2 TABLET ORAL DAILY
COMMUNITY
End: 2019-01-09 | Stop reason: DRUGHIGH

## 2018-10-17 RX ORDER — WARFARIN SODIUM 5 MG/1
1 TABLET ORAL DAILY
Refills: 1 | COMMUNITY
Start: 2018-09-13 | End: 2018-12-19

## 2018-10-17 RX ORDER — FAMOTIDINE 20 MG/1
20 TABLET ORAL DAILY
COMMUNITY
End: 2018-11-19 | Stop reason: ALTCHOICE

## 2018-10-17 NOTE — TELEPHONE ENCOUNTER
Patient given nystatin powder in the hospital for redness in groin area. He just took the bottle from the hospital home with him and has been applying the powder to his groin twice daily.    Daughter states redness is greatly improving with powder but not 1

## 2018-10-17 NOTE — TELEPHONE ENCOUNTER
Let pt's wife know the following below. The patient's wife verbalized her understanding and had no other questions at this time.

## 2018-10-17 NOTE — PROGRESS NOTES
[facility] [place]  PROGRESS NOTE  Chief Complaint:   Patient presents with:  Hospital F/U: 9/26-10/10 ruptured spleen      HPI:   This is a 74year old male coming in for hospital follow-up.   He has been anticoagulated since his diagnosis of at medicine for it. He is not using his current Norco prescription. He does still have an indwelling Hernandez catheter. He is doing physical therapy at home. He does have home health nursing. He denies any chest pain.   He is continuing to take his iron supp Outpatient Medications:  Warfarin Sodium 5 MG Oral Tab Take 1 tablet by mouth daily.  Disp:  Rfl: 1   ferrous sulfate 325 (65 FE) MG Oral Tab EC Take 325 mg by mouth daily with breakfast. Disp:  Rfl:    Cholecalciferol (VITAMIN D3) 400 units Oral Cap Take 2 enlarged nodes or history of splenectomy. PSYCHIATRIC:  Denies depression or anxiety. ENDOCRINOLOGIC:  Denies excessive sweating, cold or heat intolerance, polyuria or polydipsia.   ALLERGIES:  Denies allergic response, history of asthma, sneezing, hives, METABOLIC PANEL (14); Future    2. 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 - CBC WITH DIFFERENTIAL WITH PLATELET; Future  - COMP METABOLIC PANEL (14); Future    3.  Acute kidney failure with lesion of tubular treatments as a result of today.      Problem List:  Patient Active Problem List:     Persistent atrial fibrillation (HCC)     BPH (benign prostatic hyperplasia)     Cervical root lesion     Cough     Elevated prostate specific antigen (PSA)     General bod

## 2018-10-19 ENCOUNTER — TELEPHONE (OUTPATIENT)
Dept: FAMILY MEDICINE CLINIC | Facility: CLINIC | Age: 73
End: 2018-10-19

## 2018-10-19 NOTE — TELEPHONE ENCOUNTER
Please call Jose Juan Lao. His blood work is much better. His hemoglobin is now up to 10.4. His kidney function has improved. This is very good news. Please continue with the same therapy and progress. Keep up the good work.

## 2018-10-23 RX ORDER — TAMSULOSIN HYDROCHLORIDE 0.4 MG/1
0.4 CAPSULE ORAL DAILY
Qty: 30 CAPSULE | Refills: 1 | Status: SHIPPED | OUTPATIENT
Start: 2018-10-23 | End: 2018-12-16

## 2018-10-23 NOTE — TELEPHONE ENCOUNTER
Future appt:     Your appointments     Date & Time Appointment Department [facility])    Oct 31, 2018  1:00 PM CDT FOLLOW UP with [facility] Kimmy Sun at [address] [facility])    Nov 19, 2018 11:30 AM CST Follow up with [facility]

## 2018-11-07 PROCEDURE — 87086 URINE CULTURE/COLONY COUNT: CPT | Performed by: OBSTETRICS & GYNECOLOGY

## 2018-11-12 ENCOUNTER — TELEPHONE (OUTPATIENT)
Dept: FAMILY MEDICINE CLINIC | Facility: CLINIC | Age: 73
End: 2018-11-12

## 2018-11-12 RX ORDER — CIPROFLOXACIN 250 MG/1
250 TABLET, FILM COATED ORAL 2 TIMES DAILY
Qty: 20 TABLET | Refills: 0 | Status: SHIPPED | OUTPATIENT
Start: 2018-11-12 | End: 2018-11-22

## 2018-11-12 NOTE — TELEPHONE ENCOUNTER
Please call Jose Juan Lao. His urine shows a urinary tract infection. Plan: Start Cipro 250 mg twice daily for 10 days. This can affect his warfarin. He should have an INR checked in 3-4 days.

## 2018-11-12 NOTE — TELEPHONE ENCOUNTER
[facility] home nurse informed of below. She will let patient know of Rx. States  Patient will have INR checked on Thursday.   Merissa Xavier, 11/12/18, 5:06 PM

## 2018-11-12 NOTE — TELEPHONE ENCOUNTER
Per Vermillion-  States Patient c/o pain in prostate last night. Patient informed her that temp went to 101 last night. No fever today. States Patient's urine is cloudy and c/o urinary frequency. Sent UA to [facility]. Please advise.   Román Hidalgo, 11/12/18

## 2018-11-15 ENCOUNTER — TELEPHONE (OUTPATIENT)
Dept: FAMILY MEDICINE CLINIC | Facility: CLINIC | Age: 73
End: 2018-11-15

## 2018-11-15 DIAGNOSIS — I48.0 PAROXYSMAL ATRIAL FIBRILLATION (HCC): Primary | ICD-10-CM

## 2018-11-15 NOTE — TELEPHONE ENCOUNTER
Spoke with Vermillion at [facility]. She states patient's coumadin is managed through [facility] coumadin clinic. Advised that he can have his INR drawn in the lab, if his coumadin clinic faxes an order. She will advise patient.

## 2018-11-16 ENCOUNTER — TELEPHONE (OUTPATIENT)
Dept: FAMILY MEDICINE CLINIC | Facility: CLINIC | Age: 73
End: 2018-11-16

## 2018-11-16 NOTE — TELEPHONE ENCOUNTER
Patient is having abdominal pain, patient rates his pain to be 5 out of 10 from sharp to dull pain.  Please call back

## 2018-11-16 NOTE — TELEPHONE ENCOUNTER
Unfortunately there are many things that can cause that level of abdominal pain. The next step is for him to come in for evaluation. We can either see him here or he can see his surgeon. Please ask him to schedule an appointment.   It does not sound urge

## 2018-11-16 NOTE — TELEPHONE ENCOUNTER
Jennifer informed of below. Patient does have an appt scheduled Monday 11/19 with Dr Jerardo Ponce. Will evaluate at time of visit/agreed. Jeremie Mims, 11/16/18, 12:53 PM    Future appt:     Your appointments     Date & Time Appointment Department [facility])    N

## 2018-11-16 NOTE — TELEPHONE ENCOUNTER
Per Ricard Hodgkins-  She saw Patient yesterday. Patient c/o intermittent sharp to dull pain Midline Abdominal Incision. Incision is healed. Bowel sounds good. Pain scale 5 at worst.  No specific movement causes this. It's intermittent. Please advise.   St

## 2018-11-19 ENCOUNTER — OFFICE VISIT (OUTPATIENT)
Dept: FAMILY MEDICINE CLINIC | Facility: CLINIC | Age: 73
End: 2018-11-19
Payer: MEDICARE

## 2018-11-19 VITALS
RESPIRATION RATE: 16 BRPM | HEART RATE: 112 BPM | WEIGHT: 178.63 LBS | TEMPERATURE: 98 F | DIASTOLIC BLOOD PRESSURE: 68 MMHG | HEIGHT: 68 IN | SYSTOLIC BLOOD PRESSURE: 124 MMHG | BODY MASS INDEX: 27.07 KG/M2

## 2018-11-19 DIAGNOSIS — Z90.81 H/O SPLENECTOMY: ICD-10-CM

## 2018-11-19 DIAGNOSIS — N40.1 BENIGN PROSTATIC HYPERPLASIA WITH NOCTURIA: ICD-10-CM

## 2018-11-19 DIAGNOSIS — I48.19 PERSISTENT ATRIAL FIBRILLATION (HCC): Primary | ICD-10-CM

## 2018-11-19 DIAGNOSIS — N28.9 RENAL INSUFFICIENCY SYNDROME: ICD-10-CM

## 2018-11-19 DIAGNOSIS — R35.1 BENIGN PROSTATIC HYPERPLASIA WITH NOCTURIA: ICD-10-CM

## 2018-11-19 DIAGNOSIS — J86.9 EMPYEMA LUNG (HCC): ICD-10-CM

## 2018-11-19 DIAGNOSIS — D62 ACUTE POSTHEMORRHAGIC ANEMIA: ICD-10-CM

## 2018-11-19 DIAGNOSIS — D68.32 WARFARIN-INDUCED COAGULOPATHY (HCC): ICD-10-CM

## 2018-11-19 DIAGNOSIS — M15.9 PRIMARY OSTEOARTHRITIS INVOLVING MULTIPLE JOINTS: ICD-10-CM

## 2018-11-19 DIAGNOSIS — T45.515A WARFARIN-INDUCED COAGULOPATHY (HCC): ICD-10-CM

## 2018-11-19 PROBLEM — S36.039A LACERATION OF SPLEEN: Status: RESOLVED | Noted: 2018-09-22 | Resolved: 2018-11-19

## 2018-11-19 PROBLEM — J98.2 PNEUMOMEDIASTINUM (HCC): Status: RESOLVED | Noted: 2018-07-31 | Resolved: 2018-11-19

## 2018-11-19 PROBLEM — R50.9 FEVER: Status: RESOLVED | Noted: 2018-08-15 | Resolved: 2018-11-19

## 2018-11-19 PROCEDURE — 99214 OFFICE O/P EST MOD 30 MIN: CPT | Performed by: FAMILY MEDICINE

## 2018-11-19 PROCEDURE — 1111F DSCHRG MED/CURRENT MED MERGE: CPT | Performed by: FAMILY MEDICINE

## 2018-11-19 PROCEDURE — G0009 ADMIN PNEUMOCOCCAL VACCINE: HCPCS | Performed by: FAMILY MEDICINE

## 2018-11-19 PROCEDURE — 90670 PCV13 VACCINE IM: CPT | Performed by: FAMILY MEDICINE

## 2018-11-19 RX ORDER — DILTIAZEM HYDROCHLORIDE 300 MG/1
300 CAPSULE, COATED, EXTENDED RELEASE ORAL DAILY
COMMUNITY

## 2018-11-19 NOTE — PROGRESS NOTES
[address]  PROGRESS NOTE  Chief Complaint:   Patient presents with:  Hospital F/U  Follow - Up: Incisional Pain      HPI:   This is a 74year old male coming in for hospital follow-up. He is gradually recovering his strength.   He no hemoperitoneum   • Mitral regurgitation    • Osteoarthritis    • Pneumonia    • Single cyst of lung      Past Surgical History:   Procedure Laterality Date   • CATARACT     • COLONOSCOPY     • HERNIA SURGERY     • HIP REPLACEMENT SURGERY Left 2007    hip f Tablet 24 Hr Take 0.5 tablets by mouth 2 (two) times daily. Disp:  Rfl:       Counseling given: Not Answered       REVIEW OF SYSTEMS:   CONSTITUTIONAL: He is slowly regaining his strength.   EENT:  Eyes:  Denies eye pain, visual loss, blurred vision, doub EOMI, PERRLA, no scleral icterus, conjunctivae clear bilaterally, no eye discharge Ears: External normal. Nose: patent, no nasal discharge Throat:  No tonsillar erythema or exudate. Mouth:  No oral lesions or ulcerations, good dentition.   NECK: Supple, no history of renal insufficiency. His kidney function has improved and he is much better now. 7. Acute posthemorrhagic anemia  He had a posthemorrhagic anemia. His blood count has improved.     8. Warfarin-induced coagulopathy ([facility] [address]  He did have a coagul

## 2018-11-27 ENCOUNTER — TELEPHONE (OUTPATIENT)
Dept: FAMILY MEDICINE CLINIC | Facility: CLINIC | Age: 73
End: 2018-11-27

## 2018-11-27 NOTE — TELEPHONE ENCOUNTER
Per Gilles De La Paz-  She saw Patient today. States Patient has wheezing throughout all lung fields. States Patient actually feels less short of breath since stopping Symbicort. Appt given 11:30 Wed 11/28 with  Dr Loretta Amezcua for evaluation.   Patient advised if sx

## 2018-11-28 ENCOUNTER — OFFICE VISIT (OUTPATIENT)
Dept: FAMILY MEDICINE CLINIC | Facility: CLINIC | Age: 73
End: 2018-11-28
Payer: MEDICARE

## 2018-11-28 VITALS
BODY MASS INDEX: 27.22 KG/M2 | TEMPERATURE: 97 F | HEIGHT: 68 IN | RESPIRATION RATE: 18 BRPM | SYSTOLIC BLOOD PRESSURE: 120 MMHG | WEIGHT: 179.63 LBS | HEART RATE: 68 BPM | DIASTOLIC BLOOD PRESSURE: 62 MMHG | OXYGEN SATURATION: 97 %

## 2018-11-28 DIAGNOSIS — J86.9 EMPYEMA LUNG (HCC): ICD-10-CM

## 2018-11-28 DIAGNOSIS — R06.2 WHEEZING: Primary | ICD-10-CM

## 2018-11-28 PROCEDURE — 99213 OFFICE O/P EST LOW 20 MIN: CPT | Performed by: FAMILY MEDICINE

## 2018-11-28 RX ORDER — BUDESONIDE AND FORMOTEROL FUMARATE DIHYDRATE 160; 4.5 UG/1; UG/1
2 AEROSOL RESPIRATORY (INHALATION) 2 TIMES DAILY
Qty: 1 INHALER | Refills: 5 | Status: SHIPPED | OUTPATIENT
Start: 2018-11-28 | End: 2019-06-07

## 2018-11-28 NOTE — PROGRESS NOTES
[facility] [place]  PROGRESS NOTE  Chief Complaint:   Patient presents with:  Wheezing: nurse noticed yesterday      HPI:   This is a 74year old male coming in for wheezing that was noticed by the home health nurse.   We discontinued the [facility] Osteoarthritis    • Pneumonia    • Single cyst of lung      Past Surgical History:   Procedure Laterality Date   • CATARACT     • COLONOSCOPY     • HERNIA SURGERY     • HIP REPLACEMENT SURGERY Left 2007    hip fracture   • OTHER SURGICAL HISTORY      Toi Camacho mouth 2 (two) times daily. Disp:  Rfl:       Counseling given: Not Answered       REVIEW OF SYSTEMS:   CONSTITUTIONAL: He feels like he is gradually gaining his strength back.   EENT:  Eyes:  Denies eye pain, visual loss, blurred vision, double vision or developed, well nourished, no apparent distress.   HEENT:  Head:  Normocephalic, atraumatic Eyes: EOMI, PERRLA, no scleral icterus, conjunctivae clear bilaterally, no eye discharge Ears: External normal. Nose: patent, no nasal discharge Throat:  No tonsilla general medical examination at a health care facility     Inguinal hernia     Lung nodule     Melanoma of skin [facility])     Mitral valve disease     Special screening for malignant neoplasm of prostate     Status post mitral valve repair     Aortic valve diso

## 2018-12-12 ENCOUNTER — MED REC SCAN ONLY (OUTPATIENT)
Dept: FAMILY MEDICINE CLINIC | Facility: CLINIC | Age: 73
End: 2018-12-12

## 2018-12-17 RX ORDER — TAMSULOSIN HYDROCHLORIDE 0.4 MG/1
0.4 CAPSULE ORAL DAILY
Qty: 90 CAPSULE | Refills: 3 | Status: SHIPPED | OUTPATIENT
Start: 2018-12-17 | End: 2019-06-07

## 2018-12-17 NOTE — TELEPHONE ENCOUNTER
Future appt:     Your appointments     Date & Time Appointment Department [facility])    Dec 19, 2018 11:30 AM CST Follow up with Angle Oliver MD [address] [facility]    Jan 30, 2019  2:00 PM CST [place]

## 2018-12-19 ENCOUNTER — OFFICE VISIT (OUTPATIENT)
Dept: FAMILY MEDICINE CLINIC | Facility: CLINIC | Age: 73
End: 2018-12-19
Payer: MEDICARE

## 2018-12-19 VITALS
WEIGHT: 182 LBS | SYSTOLIC BLOOD PRESSURE: 124 MMHG | TEMPERATURE: 97 F | RESPIRATION RATE: 16 BRPM | DIASTOLIC BLOOD PRESSURE: 74 MMHG | HEART RATE: 104 BPM | HEIGHT: 68 IN | BODY MASS INDEX: 27.58 KG/M2

## 2018-12-19 DIAGNOSIS — N40.1 BENIGN PROSTATIC HYPERPLASIA WITH NOCTURIA: ICD-10-CM

## 2018-12-19 DIAGNOSIS — I05.9 MITRAL VALVE DISEASE: ICD-10-CM

## 2018-12-19 DIAGNOSIS — M15.9 PRIMARY OSTEOARTHRITIS INVOLVING MULTIPLE JOINTS: ICD-10-CM

## 2018-12-19 DIAGNOSIS — I35.9 AORTIC VALVE DISORDER: ICD-10-CM

## 2018-12-19 DIAGNOSIS — I48.19 PERSISTENT ATRIAL FIBRILLATION (HCC): Primary | ICD-10-CM

## 2018-12-19 DIAGNOSIS — R91.1 LUNG NODULE: ICD-10-CM

## 2018-12-19 DIAGNOSIS — R35.1 BENIGN PROSTATIC HYPERPLASIA WITH NOCTURIA: ICD-10-CM

## 2018-12-19 DIAGNOSIS — I82.432 ACUTE DEEP VEIN THROMBOSIS (DVT) OF POPLITEAL VEIN OF LEFT LOWER EXTREMITY (HCC): ICD-10-CM

## 2018-12-19 PROBLEM — J86.9 EMPYEMA LUNG (HCC): Status: RESOLVED | Noted: 2018-09-11 | Resolved: 2018-12-19

## 2018-12-19 PROBLEM — R05.9 COUGH: Status: RESOLVED | Noted: 2017-01-18 | Resolved: 2018-12-19

## 2018-12-19 PROBLEM — I95.9 HYPOTENSION: Status: RESOLVED | Noted: 2018-09-22 | Resolved: 2018-12-19

## 2018-12-19 PROCEDURE — 99213 OFFICE O/P EST LOW 20 MIN: CPT | Performed by: FAMILY MEDICINE

## 2018-12-19 RX ORDER — RIVAROXABAN 20 MG/1
1 TABLET, FILM COATED ORAL DAILY
Refills: 3 | COMMUNITY
Start: 2018-12-12

## 2018-12-19 NOTE — PROGRESS NOTES
[address]  PROGRESS NOTE  Chief Complaint:   Patient presents with: Follow - Up      HPI:   This is a 74year old male coming in for follow-up.   He said that after he received the Prevnar vaccine he began experiencing severe pain in danii Cancer [facility])     Melanoma/Ear   • Laceration of spleen 9/22/2018    Overview:  Grade 3 laceration with large hemoperitoneum   • Mitral regurgitation    • Osteoarthritis    • Pneumonia    • Single cyst of lung      Past Surgical History:   Procedure Lateral Disp:  Rfl:    acetaminophen 325 MG Oral Tab Take 325 mg by mouth every 4 (four) hours as needed for Pain. Disp:  Rfl:    Metoprolol Succinate ER 25 MG Oral Tablet 24 Hr Take 25 mg by mouth 2 (two) times daily.    Disp:  Rfl:       Counseling given: Not Ans problem. HEENT:  Head:  Normocephalic, atraumatic Eyes: EOMI, PERRLA, no scleral icterus, conjunctivae clear bilaterally, no eye discharge Ears: External normal. Nose: patent, no nasal discharge Throat:  No tonsillar erythema or exudate.   Mouth:  No oral At that point we will consider the Haemophilus influenza type b vaccine and a meningococcal vaccine.       Meds & Refills for this Visit:  Requested Prescriptions      No prescriptions requested or ordered in this encounter       Health Maintenance:  [employer]

## 2019-01-07 ENCOUNTER — TELEPHONE (OUTPATIENT)
Dept: FAMILY MEDICINE CLINIC | Facility: CLINIC | Age: 74
End: 2019-01-07

## 2019-01-07 NOTE — TELEPHONE ENCOUNTER
Patient is taking iron pills 325MG, patient is almost out and wants to know if Dr wants him to keep taking them

## 2019-01-09 RX ORDER — BIOTIN 1 MG
1 TABLET ORAL DAILY
COMMUNITY
End: 2019-03-19

## 2019-01-09 NOTE — TELEPHONE ENCOUNTER
Patient informed of below. Patient states He is taking Vitamin D3 1000u daily. Asking if He should continue this dose. Advised to continue Vitamin D3 until He returns to see Dr Lunsford/agreed.   Ivania Lowry, 01/09/19, 12:53 PM

## 2019-02-01 ENCOUNTER — TELEPHONE (OUTPATIENT)
Dept: FAMILY MEDICINE CLINIC | Facility: CLINIC | Age: 74
End: 2019-02-01

## 2019-02-01 ENCOUNTER — OFFICE VISIT (OUTPATIENT)
Dept: FAMILY MEDICINE CLINIC | Facility: CLINIC | Age: 74
End: 2019-02-01
Payer: MEDICARE

## 2019-02-01 VITALS
DIASTOLIC BLOOD PRESSURE: 68 MMHG | RESPIRATION RATE: 18 BRPM | HEIGHT: 68 IN | HEART RATE: 88 BPM | BODY MASS INDEX: 27.53 KG/M2 | WEIGHT: 181.63 LBS | SYSTOLIC BLOOD PRESSURE: 128 MMHG | TEMPERATURE: 98 F | OXYGEN SATURATION: 96 %

## 2019-02-01 DIAGNOSIS — J40 BRONCHITIS: Primary | ICD-10-CM

## 2019-02-01 DIAGNOSIS — I48.19 PERSISTENT ATRIAL FIBRILLATION (HCC): ICD-10-CM

## 2019-02-01 PROCEDURE — 99213 OFFICE O/P EST LOW 20 MIN: CPT | Performed by: FAMILY MEDICINE

## 2019-02-01 NOTE — PROGRESS NOTES
[facility] [place]  PROGRESS NOTE  Chief Complaint:   Patient presents with:  Cough      HPI:   This is a 74year old male coming in for a cough. He said that he was doing relatively well until 2 days ago. He developed a cough and a fever.   Lilliana Curiel fracture   • OTHER SURGICAL HISTORY      Mitral Valve Repair   • OTHER SURGICAL HISTORY      spleen & kidney removed   • REMOVAL OF LUNG,LOBECTOMY Left 07/12/2018   • REPLACEMENT OF MITRAL VALVE     • SKIN SURGERY     • SPECIAL SERVICE OR REPORT      splen vision or yellow sclerae. Ears, Nose, Throat:  Denies hearing loss, sneezing, congestion, runny nose or sore throat. INTEGUMENTARY:  Denies rashes, itching, skin lesion, or excessive skin dryness.   CARDIOVASCULAR:  Denies chest pain, chest pressure, chest rashes, no skin lesion, no bruising, good turgor. HEART:  Regular rate and rhythm, no murmurs, rubs or gallops. LUNGS: Clear to auscultation bilterally, no rales/rhonchi/wheezing. ASSESSMENT AND PLAN:   1.  Bronchitis  This was an acute exacerbation of cruris     Abdominal pain     Chest pain     H/O splenectomy     Wheezing      Carlos A Chao MD  2/1/2019  11:32 AM

## 2019-02-15 ENCOUNTER — TELEPHONE (OUTPATIENT)
Dept: FAMILY MEDICINE CLINIC | Facility: CLINIC | Age: 74
End: 2019-02-15

## 2019-02-15 RX ORDER — AMOXICILLIN AND CLAVULANATE POTASSIUM 875; 125 MG/1; MG/1
1 TABLET, FILM COATED ORAL 2 TIMES DAILY
Qty: 20 TABLET | Refills: 0 | Status: SHIPPED | OUTPATIENT
Start: 2019-02-15 | End: 2019-02-25

## 2019-02-15 NOTE — TELEPHONE ENCOUNTER
I will send in a prescription for Augmentin. Please take 1 tablet twice a day for 10 days. Continue to use the Symbicort inhaler twice a day every day.

## 2019-02-15 NOTE — TELEPHONE ENCOUNTER
Patient was on [address] 2 weeks ago for illness. States He went to [facility] for a .  When He returned, His Sinus Sx have started coming back. States He coughs as soon as he gets up from laying down. Cough is not productve.  Nasal congestion, one nostr

## 2019-03-19 ENCOUNTER — OFFICE VISIT (OUTPATIENT)
Dept: FAMILY MEDICINE CLINIC | Facility: CLINIC | Age: 74
End: 2019-03-19
Payer: MEDICARE

## 2019-03-19 VITALS
HEART RATE: 112 BPM | WEIGHT: 188 LBS | HEIGHT: 68.5 IN | DIASTOLIC BLOOD PRESSURE: 80 MMHG | BODY MASS INDEX: 28.17 KG/M2 | RESPIRATION RATE: 16 BRPM | SYSTOLIC BLOOD PRESSURE: 128 MMHG | TEMPERATURE: 98 F

## 2019-03-19 DIAGNOSIS — R06.2 WHEEZING: ICD-10-CM

## 2019-03-19 DIAGNOSIS — I73.9 PERIPHERAL VASCULAR DISEASE (HCC): ICD-10-CM

## 2019-03-19 DIAGNOSIS — M79.10 MYALGIA: ICD-10-CM

## 2019-03-19 DIAGNOSIS — N28.9 RENAL INSUFFICIENCY SYNDROME: ICD-10-CM

## 2019-03-19 DIAGNOSIS — Z79.01 CURRENT USE OF LONG TERM ANTICOAGULATION: ICD-10-CM

## 2019-03-19 DIAGNOSIS — M15.9 PRIMARY OSTEOARTHRITIS INVOLVING MULTIPLE JOINTS: ICD-10-CM

## 2019-03-19 DIAGNOSIS — I82.432 ACUTE DEEP VEIN THROMBOSIS (DVT) OF POPLITEAL VEIN OF LEFT LOWER EXTREMITY (HCC): ICD-10-CM

## 2019-03-19 DIAGNOSIS — I48.19 PERSISTENT ATRIAL FIBRILLATION (HCC): Primary | ICD-10-CM

## 2019-03-19 DIAGNOSIS — R29.898 WEAKNESS OF BOTH LOWER EXTREMITIES: ICD-10-CM

## 2019-03-19 PROBLEM — D49.2 SOLITARY FIBROUS TUMOR: Status: ACTIVE | Noted: 2019-02-22

## 2019-03-19 PROCEDURE — 99214 OFFICE O/P EST MOD 30 MIN: CPT | Performed by: FAMILY MEDICINE

## 2019-03-19 RX ORDER — FAMOTIDINE 20 MG
1 TABLET ORAL DAILY
COMMUNITY
End: 2019-06-19

## 2019-03-19 NOTE — PROGRESS NOTES
[address]  PROGRESS NOTE  Chief Complaint:   Patient presents with: Follow - Up      HPI:   This is a 74year old male coming in for up. He said that his upper body strength is improved and he now feels much stronger in his arms.   He Surgical History:   Procedure Laterality Date   • CATARACT     • COLONOSCOPY     • HERNIA SURGERY     • HIP REPLACEMENT SURGERY Left 2007    hip fracture   • OTHER SURGICAL HISTORY      Mitral Valve Repair   • OTHER SURGICAL HISTORY      spleen & kidney re CONSTITUTIONAL: He has diffuse weakness primarily in his legs. EENT:  Eyes:  Denies eye pain, visual loss, blurred vision, double vision or yellow sclerae. Ears, Nose, Throat:  Denies hearing loss, sneezing, congestion, runny nose or sore throat.   INTEG Nose: patent, no nasal discharge Throat:  No tonsillar erythema or exudate. Mouth:  No oral lesions or ulcerations, good dentition. NECK: Supple, no CLAD, no JVD, no thyromegaly. SKIN: No rashes, no skin lesion, no bruising, good turgor.   HEART: Maryan Mauricio of long term anticoagulation  He continues to take his Xarelto. 7. Peripheral vascular disease ([address]   He is leg discomfort is consistent with possible peripheral vascular disease. We will do the arterial Doppler to evaluate his arterial blood flow.   - [facility]     Tinea cruris     Abdominal pain     Chest pain     H/O splenectomy     Wheezing     Solitary fibrous tumor     Myalgia     Weakness of both lower extremities      Yue Herndon MD  3/19/2019  12:29 PM

## 2019-03-22 ENCOUNTER — TELEPHONE (OUTPATIENT)
Dept: FAMILY MEDICINE CLINIC | Facility: CLINIC | Age: 74
End: 2019-03-22

## 2019-03-22 DIAGNOSIS — R29.898 WEAKNESS OF BOTH LOWER EXTREMITIES: Primary | ICD-10-CM

## 2019-03-22 DIAGNOSIS — I73.9 PERIPHERAL VASCULAR DISEASE (HCC): ICD-10-CM

## 2019-03-22 NOTE — TELEPHONE ENCOUNTER
[facility] Patient Schedule states they do not do the   Compressed exercise Arterial U/s. Asking if this is to be a Doppler U/S? Please advise.   Ivania Lowry, 03/22/19, 10:46 AM

## 2019-03-22 NOTE — TELEPHONE ENCOUNTER
I apologize. I clicked the wrong order in the order sheet. This should be Doppler arterial ultrasound of both legs.

## 2019-03-29 ENCOUNTER — TELEPHONE (OUTPATIENT)
Dept: FAMILY MEDICINE CLINIC | Facility: CLINIC | Age: 74
End: 2019-03-29

## 2019-03-29 NOTE — TELEPHONE ENCOUNTER
I called and spoke with Trina Maddox. He is arterial Doppler was completely normal.  He said that his legs are actually feeling a little bit stronger and a little bit less uncomfortable. He said he is able to do more in the way of chores.   He was able to move a 5

## 2019-05-29 PROBLEM — R33.9 INCOMPLETE BLADDER EMPTYING: Status: ACTIVE | Noted: 2019-05-29

## 2019-06-03 ENCOUNTER — OFFICE VISIT (OUTPATIENT)
Dept: FAMILY MEDICINE CLINIC | Facility: CLINIC | Age: 74
End: 2019-06-03
Payer: MEDICARE

## 2019-06-03 VITALS
BODY MASS INDEX: 26.84 KG/M2 | WEIGHT: 179.13 LBS | RESPIRATION RATE: 20 BRPM | HEIGHT: 68.5 IN | HEART RATE: 54 BPM | OXYGEN SATURATION: 98 % | TEMPERATURE: 99 F | SYSTOLIC BLOOD PRESSURE: 124 MMHG | DIASTOLIC BLOOD PRESSURE: 82 MMHG

## 2019-06-03 DIAGNOSIS — R05.9 COUGH: Primary | ICD-10-CM

## 2019-06-03 DIAGNOSIS — S39.012A STRAIN OF MUSCLE, FASCIA AND TENDON OF LOWER BACK, INITIAL ENCOUNTER: ICD-10-CM

## 2019-06-03 PROCEDURE — 99214 OFFICE O/P EST MOD 30 MIN: CPT | Performed by: NURSE PRACTITIONER

## 2019-06-03 NOTE — PROGRESS NOTES
[facility] [place]  PROGRESS NOTE  Chief Complaint:   Patient presents with:  Cough  Low Back Pain: Mason back \"pop\" - now difficult to walk      HPI:   This is a 74year old male coming in for cough for 7 days.      Reports cough over the last status: Former Smoker        Packs/day: 1.00        Years: 21.00        Pack years: 21        Types: Cigarettes        Start date: 1964        Quit date: 1985        Years since quittin.4      Smokeless tobacco: Never Used    Alcohol use:  Yes or at rest.  RESPIRATORY:  Denies shortness of breath, wheezing, or sputum. See HPI for additional details. GASTROINTESTINAL:  Denies abdominal pain, nausea, vomiting, constipation, diarrhea, or blood in stool.   GENITOURINARY: Denies urinary changes, jed No tonsillar exudate. Posterior pharynx erythema and cobblestoning present. Clear drainage noted at posterior pharynx. Mouth:  No oral lesions or ulcerations, good dentition. NECK: Supple, no lymphadenopathy, no JVD, no thyromegaly.   SKIN: No rashes patient and wife on safety precautions due to patient being on blood thinners and consider fall risk. Instructed patient to continue use support devices including cane but strongly recommended patient use walker for additional support and safety.       Patricia Muro Persistent atrial fibrillation (HCC)     Enlarged prostate with lower urinary tract symptoms (LUTS)     Cervical root lesion     Elevated prostate specific antigen (PSA)     General body deterioration     Routine general medical examination at a health car

## 2019-06-03 NOTE — PATIENT INSTRUCTIONS
Start plain claritin 10mg once a day. Start flonase 2 sprays each nostril once a day for a week then one spray each nostril for an additional week. Alternate cold packs for 20 minutes then warm packs for 20 minutes at least four times a day.   Do light

## 2019-06-07 ENCOUNTER — OFFICE VISIT (OUTPATIENT)
Dept: FAMILY MEDICINE CLINIC | Facility: CLINIC | Age: 74
End: 2019-06-07
Payer: MEDICARE

## 2019-06-07 ENCOUNTER — TELEPHONE (OUTPATIENT)
Dept: FAMILY MEDICINE CLINIC | Facility: CLINIC | Age: 74
End: 2019-06-07

## 2019-06-07 VITALS
HEART RATE: 72 BPM | TEMPERATURE: 100 F | OXYGEN SATURATION: 94 % | DIASTOLIC BLOOD PRESSURE: 70 MMHG | SYSTOLIC BLOOD PRESSURE: 120 MMHG | BODY MASS INDEX: 27 KG/M2 | RESPIRATION RATE: 20 BRPM | HEIGHT: 68.5 IN

## 2019-06-07 DIAGNOSIS — N30.00 ACUTE CYSTITIS WITHOUT HEMATURIA: ICD-10-CM

## 2019-06-07 DIAGNOSIS — M54.50 ACUTE MIDLINE LOW BACK PAIN WITHOUT SCIATICA: ICD-10-CM

## 2019-06-07 DIAGNOSIS — R35.0 FREQUENCY OF URINATION: Primary | ICD-10-CM

## 2019-06-07 DIAGNOSIS — J44.1 ACUTE EXACERBATION OF CHRONIC OBSTRUCTIVE PULMONARY DISEASE (COPD) (HCC): ICD-10-CM

## 2019-06-07 DIAGNOSIS — R06.2 WHEEZING: ICD-10-CM

## 2019-06-07 PROBLEM — J40 BRONCHITIS: Status: RESOLVED | Noted: 2018-01-31 | Resolved: 2019-06-07

## 2019-06-07 PROBLEM — Z90.5 ACQUIRED ABSENCE OF KIDNEY: Status: ACTIVE | Noted: 2019-06-07

## 2019-06-07 PROBLEM — N18.30 CHRONIC KIDNEY DISEASE, STAGE 3 (MODERATE): Status: ACTIVE | Noted: 2019-06-07

## 2019-06-07 PROBLEM — J90 PLEURAL EFFUSION, NOT ELSEWHERE CLASSIFIED: Status: ACTIVE | Noted: 2019-06-07

## 2019-06-07 PROCEDURE — 99214 OFFICE O/P EST MOD 30 MIN: CPT | Performed by: FAMILY MEDICINE

## 2019-06-07 PROCEDURE — 81003 URINALYSIS AUTO W/O SCOPE: CPT | Performed by: FAMILY MEDICINE

## 2019-06-07 RX ORDER — LEVOFLOXACIN 500 MG/1
500 TABLET, FILM COATED ORAL DAILY
Qty: 7 TABLET | Refills: 0 | Status: SHIPPED | OUTPATIENT
Start: 2019-06-07 | End: 2019-06-14

## 2019-06-07 RX ORDER — BUDESONIDE AND FORMOTEROL FUMARATE DIHYDRATE 160; 4.5 UG/1; UG/1
2 AEROSOL RESPIRATORY (INHALATION) 2 TIMES DAILY
Qty: 3 INHALER | Refills: 1 | Status: SHIPPED | OUTPATIENT
Start: 2019-06-07 | End: 2019-12-11

## 2019-06-07 NOTE — PROGRESS NOTES
[facility] [place]  PROGRESS NOTE  Chief Complaint:   Patient presents with:  Cough  Shortness Of Breath  Urinary Frequency      HPI:   This is a 74year old male coming in for multiple complaints. He said he had been doing well until June 2. HERNIA SURGERY     • HIP REPLACEMENT SURGERY Left 2007    hip fracture   • OTHER SURGICAL HISTORY      Mitral Valve Repair   • OTHER SURGICAL HISTORY      spleen & kidney removed   • OTHER SURGICAL HISTORY  05/29/2019    cystoscopy dr April Knapp Eyes:  Denies eye pain, visual loss, blurred vision, double vision or yellow sclerae. Ears, Nose, Throat:  Denies hearing loss, sneezing, congestion, runny nose or sore throat.   INTEGUMENTARY:  Denies rashes, itching, skin lesion, or excessive skin dryness Mouth:  No oral lesions or ulcerations, good dentition. NECK: Supple, no CLAD, no JVD, no thyromegaly. SKIN: No rashes, no skin lesion, no bruising, good turgor. HEART:  Regular rate and rhythm, no murmurs, rubs or gallops.   LUNGS: Decreased air entry i fibrillation ([address]     Enlarged prostate with lower urinary tract symptoms (LUTS)     Cervical root lesion     Elevated prostate specific antigen (PSA)     General body deterioration     Routine general medical examination at a health care facility     [facility]

## 2019-06-07 NOTE — TELEPHONE ENCOUNTER
was seen on monday with danyell, has very bad shortness of breath from doing nothing, feels cough is getting worse, now thinks may have a uti

## 2019-06-07 NOTE — TELEPHONE ENCOUNTER
Patient with increased SOB with any exhertion. [place] and very winded. Offered appt now. States will take awhile to get here. Appt given 12:45 with  for evaluation of sx.   Jesse Abreu, 06/07/19, 8:43 AM

## 2019-06-19 ENCOUNTER — OFFICE VISIT (OUTPATIENT)
Dept: FAMILY MEDICINE CLINIC | Facility: CLINIC | Age: 74
End: 2019-06-19
Payer: MEDICARE

## 2019-06-19 VITALS
TEMPERATURE: 98 F | BODY MASS INDEX: 27.17 KG/M2 | SYSTOLIC BLOOD PRESSURE: 120 MMHG | OXYGEN SATURATION: 96 % | WEIGHT: 181.38 LBS | RESPIRATION RATE: 18 BRPM | DIASTOLIC BLOOD PRESSURE: 70 MMHG | HEART RATE: 107 BPM | HEIGHT: 68.5 IN

## 2019-06-19 DIAGNOSIS — N30.00 ACUTE CYSTITIS WITHOUT HEMATURIA: ICD-10-CM

## 2019-06-19 DIAGNOSIS — J44.1 ACUTE EXACERBATION OF CHRONIC OBSTRUCTIVE PULMONARY DISEASE (COPD) (HCC): Primary | ICD-10-CM

## 2019-06-19 DIAGNOSIS — I48.21 PERMANENT ATRIAL FIBRILLATION (HCC): ICD-10-CM

## 2019-06-19 PROCEDURE — 99213 OFFICE O/P EST LOW 20 MIN: CPT | Performed by: FAMILY MEDICINE

## 2019-06-19 NOTE — PROGRESS NOTES
[address]  PROGRESS NOTE  Chief Complaint:   Patient presents with: Follow - Up      HPI:   This is a 74year old male coming in for follow-up. He has completed taking the antibiotic.   He did not have any side effects or problems asso and left nephrectomy 9/27/18     Social History:  Social History    Tobacco Use      Smoking status: Former Smoker        Packs/day: 1.00        Years: 21.00        Pack years: 21        Types: Cigarettes        Start date: 1/1/1966        Quit date: 1/1/1 abdominal pain, nausea, vomiting, constipation, diarrhea, or blood in stool. MUSCULOSKELETAL:  Denies weakness, muscle aches, back pain, joint pain, swelling or stiffness.   NEUROLOGICAL:  Denies headache, seizures, dizziness, syncope, paralysis, ataxia, n this time. 3. Acute cystitis without hematuria  He has a history of acute cystitis. He does not have urinary symptoms now. His cystitis appears to be resolved.       Meds & Refills for this Visit:  Requested Prescriptions      No prescriptions requeste extremities     Incomplete bladder emptying     Pleural effusion, not elsewhere classified     Chronic kidney disease, stage 3 (moderate) (HCC)     Acquired absence of kidney     Acute exacerbation of chronic obstructive pulmonary disease (COPD) ([facility]

## 2019-09-20 ENCOUNTER — OFFICE VISIT (OUTPATIENT)
Dept: FAMILY MEDICINE CLINIC | Facility: CLINIC | Age: 74
End: 2019-09-20
Payer: MEDICARE

## 2019-09-20 VITALS
WEIGHT: 179 LBS | OXYGEN SATURATION: 97 % | HEIGHT: 68.5 IN | DIASTOLIC BLOOD PRESSURE: 80 MMHG | HEART RATE: 77 BPM | TEMPERATURE: 98 F | SYSTOLIC BLOOD PRESSURE: 122 MMHG | RESPIRATION RATE: 16 BRPM | BODY MASS INDEX: 26.82 KG/M2

## 2019-09-20 DIAGNOSIS — R29.898 WEAKNESS OF BOTH LOWER EXTREMITIES: ICD-10-CM

## 2019-09-20 DIAGNOSIS — Z13.6 ENCOUNTER FOR SCREENING FOR CARDIOVASCULAR DISORDERS: ICD-10-CM

## 2019-09-20 DIAGNOSIS — M15.9 PRIMARY OSTEOARTHRITIS INVOLVING MULTIPLE JOINTS: ICD-10-CM

## 2019-09-20 DIAGNOSIS — J44.1 ACUTE EXACERBATION OF CHRONIC OBSTRUCTIVE PULMONARY DISEASE (COPD) (HCC): Primary | ICD-10-CM

## 2019-09-20 DIAGNOSIS — Z12.5 ENCOUNTER FOR SCREENING FOR MALIGNANT NEOPLASM OF PROSTATE: ICD-10-CM

## 2019-09-20 DIAGNOSIS — Z90.81 H/O SPLENECTOMY: ICD-10-CM

## 2019-09-20 DIAGNOSIS — I48.21 PERMANENT ATRIAL FIBRILLATION (HCC): ICD-10-CM

## 2019-09-20 PROCEDURE — 99214 OFFICE O/P EST MOD 30 MIN: CPT | Performed by: FAMILY MEDICINE

## 2019-09-20 NOTE — PROGRESS NOTES
[address]  PROGRESS NOTE  Chief Complaint:   Patient presents with: Follow - Up      HPI:   This is a 66year old male coming in for follow-up. He said that he is slowly getting better but he remains very frustrated.   His frustration CATARACT     • COLONOSCOPY     • HERNIA SURGERY     • HIP REPLACEMENT SURGERY Left 2007    hip fracture   • OTHER SURGICAL HISTORY      Mitral Valve Repair   • OTHER SURGICAL HISTORY      spleen & kidney removed   • OTHER SURGICAL HISTORY  05/29/2019    cy vision or yellow sclerae. Ears, Nose, Throat:  Denies hearing loss, sneezing, congestion, runny nose or sore throat. INTEGUMENTARY:  Denies rashes, itching, skin lesion, or excessive skin dryness.   CARDIOVASCULAR:  Denies chest pain, chest pressure, chest External normal. Nose: patent, no nasal discharge Throat:  No tonsillar erythema or exudate. Mouth:  No oral lesions or ulcerations, good dentition. NECK: Supple, no CLAD, no JVD, no thyromegaly.   SKIN: No rashes, no skin lesion, no bruising, good turgor PLATELET; Future  - COMP METABOLIC PANEL (14); Future  - LIPID PANEL; Future  - PSA SCREEN; Future  - ASSAY, THYROID STIM HORMONE; Future  - URIC ACID, SERUM; Future    4. H/O splenectomy  Unchanged.     5. 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

## 2019-11-22 ENCOUNTER — TELEPHONE (OUTPATIENT)
Dept: FAMILY MEDICINE CLINIC | Facility: CLINIC | Age: 74
End: 2019-11-22

## 2019-11-22 NOTE — TELEPHONE ENCOUNTER
Patient states for a week has had worsening stomach pain. States if he lays down, he does not have pain or distention, but standing up, it feels like a softball present in stomach. Went out for breakfast today, and stomach is painful.     Patient is v

## 2019-11-22 NOTE — TELEPHONE ENCOUNTER
Patient states he has a pain in the left side of his stomach, states pain comes and goes, no diahrrea, no nausea.  Please call back

## 2019-11-29 ENCOUNTER — TELEPHONE (OUTPATIENT)
Dept: FAMILY MEDICINE CLINIC | Facility: CLINIC | Age: 74
End: 2019-11-29

## 2019-11-29 NOTE — TELEPHONE ENCOUNTER
Patient got his gallbladder removed wednesday and was told to follow up with primary care Doctor in 3 weeks, informed patient that Dr Cristie Cranker will not have any openings until the begining of January and offered a different provider.  Patient requested a ca

## 2019-11-29 NOTE — TELEPHONE ENCOUNTER
Appt for [facility] Follow Up given. Sienna Pendleton, 11/29/19, 11:12 AM    Future appt:     Your appointments     Date & Time Appointment Department [facility])    Dec 11, 2019  1:00 PM CST Exam - Established with Nic Isaac  [facility], S

## 2019-12-11 ENCOUNTER — OFFICE VISIT (OUTPATIENT)
Dept: FAMILY MEDICINE CLINIC | Facility: CLINIC | Age: 74
End: 2019-12-11
Payer: MEDICARE

## 2019-12-11 VITALS
SYSTOLIC BLOOD PRESSURE: 124 MMHG | RESPIRATION RATE: 16 BRPM | TEMPERATURE: 97 F | HEART RATE: 75 BPM | BODY MASS INDEX: 27 KG/M2 | WEIGHT: 177.38 LBS | DIASTOLIC BLOOD PRESSURE: 80 MMHG | OXYGEN SATURATION: 97 %

## 2019-12-11 DIAGNOSIS — Z86.718 HISTORY OF DVT (DEEP VEIN THROMBOSIS): ICD-10-CM

## 2019-12-11 DIAGNOSIS — N18.30 CHRONIC KIDNEY DISEASE, STAGE 3 (MODERATE): ICD-10-CM

## 2019-12-11 DIAGNOSIS — N18.30 CKD (CHRONIC KIDNEY DISEASE) STAGE 3, GFR 30-59 ML/MIN (HCC): ICD-10-CM

## 2019-12-11 DIAGNOSIS — D68.32 WARFARIN-INDUCED COAGULOPATHY (HCC): ICD-10-CM

## 2019-12-11 DIAGNOSIS — R29.898 WEAKNESS OF BOTH LOWER EXTREMITIES: ICD-10-CM

## 2019-12-11 DIAGNOSIS — K29.30 CHRONIC SUPERFICIAL GASTRITIS WITHOUT BLEEDING: Primary | ICD-10-CM

## 2019-12-11 DIAGNOSIS — I82.432 ACUTE DEEP VEIN THROMBOSIS (DVT) OF POPLITEAL VEIN OF LEFT LOWER EXTREMITY (HCC): ICD-10-CM

## 2019-12-11 DIAGNOSIS — Z79.01 CURRENT USE OF LONG TERM ANTICOAGULATION: ICD-10-CM

## 2019-12-11 DIAGNOSIS — I48.21 PERMANENT ATRIAL FIBRILLATION (HCC): ICD-10-CM

## 2019-12-11 DIAGNOSIS — C43.9 MELANOMA OF SKIN (HCC): ICD-10-CM

## 2019-12-11 DIAGNOSIS — T45.515A WARFARIN-INDUCED COAGULOPATHY (HCC): ICD-10-CM

## 2019-12-11 PROBLEM — K43.2 INCISIONAL HERNIA, WITHOUT OBSTRUCTION OR GANGRENE: Status: ACTIVE | Noted: 2019-12-06

## 2019-12-11 PROBLEM — I82.402 DEEP VEIN THROMBOSIS (DVT) OF LEFT LOWER EXTREMITY (HCC): Status: RESOLVED | Noted: 2018-09-23 | Resolved: 2019-12-11

## 2019-12-11 PROCEDURE — 99214 OFFICE O/P EST MOD 30 MIN: CPT | Performed by: FAMILY MEDICINE

## 2019-12-11 PROCEDURE — 1111F DSCHRG MED/CURRENT MED MERGE: CPT | Performed by: FAMILY MEDICINE

## 2019-12-11 RX ORDER — PANTOPRAZOLE SODIUM 40 MG/1
40 TABLET, DELAYED RELEASE ORAL DAILY
COMMUNITY
Start: 2019-11-29 | End: 2019-12-11

## 2019-12-11 RX ORDER — PANTOPRAZOLE SODIUM 40 MG/1
40 TABLET, DELAYED RELEASE ORAL DAILY
Qty: 30 TABLET | Refills: 0 | Status: SHIPPED | OUTPATIENT
Start: 2019-12-11 | End: 2020-01-10

## 2019-12-12 NOTE — PROGRESS NOTES
[place] [email or web address] [place]  PROGRESS NOTE  Chief Complaint:   Patient presents with:  Hospital F/U: Gallbladder removal at [facility]       HPI:   This is a 66year old male coming in for hospital follow-up.   He presented to the emergency department at [facility] Osteoarthritis    • Pneumonia    • Single cyst of lung      Past Surgical History:   Procedure Laterality Date   • CATARACT     • COLONOSCOPY     • HERNIA SURGERY     • HIP REPLACEMENT SURGERY Left 2007    hip fracture   • OTHER SURGICAL HISTORY      Renee Hess congestion, runny nose or sore throat. INTEGUMENTARY:  Denies rashes, itching, skin lesion, or excessive skin dryness.   CARDIOVASCULAR:  Denies chest pain, chest pressure, chest discomfort, palpitations, edema, dyspnea on exertion or at rest.  RESPIRATORY nontender, bowel sounds normal in all 4 quadrants, no masses, no hepatosplenomegaly. EXTREMITIES:  No edema, no cyanosis, no clubbing, FROM, 2+ dorsalis pedis pulses bilaterally. ASSESSMENT AND PLAN:   1.  Chronic superficial gastritis without bleedin barriers to learning. Medical education done. Outcome: Patient verbalizes understanding. Patient is notified to call with any questions, complications, allergies, or worsening or changing symptoms.   Patient is to call with any side effects or complicatio

## 2020-02-24 ENCOUNTER — OFFICE VISIT (OUTPATIENT)
Dept: FAMILY MEDICINE CLINIC | Facility: CLINIC | Age: 75
End: 2020-02-24
Payer: MEDICARE

## 2020-02-24 VITALS
OXYGEN SATURATION: 97 % | WEIGHT: 177 LBS | HEART RATE: 73 BPM | TEMPERATURE: 100 F | DIASTOLIC BLOOD PRESSURE: 78 MMHG | BODY MASS INDEX: 26.52 KG/M2 | HEIGHT: 68.5 IN | RESPIRATION RATE: 20 BRPM | SYSTOLIC BLOOD PRESSURE: 120 MMHG

## 2020-02-24 DIAGNOSIS — J44.1 COPD EXACERBATION (HCC): Primary | ICD-10-CM

## 2020-02-24 PROCEDURE — 99213 OFFICE O/P EST LOW 20 MIN: CPT | Performed by: NURSE PRACTITIONER

## 2020-02-24 RX ORDER — AZITHROMYCIN 250 MG/1
TABLET, FILM COATED ORAL
Qty: 6 TABLET | Refills: 0 | Status: SHIPPED | OUTPATIENT
Start: 2020-02-24 | End: 2020-03-04 | Stop reason: ALTCHOICE

## 2020-02-24 NOTE — PROGRESS NOTES
CHIEF COMPLAINT:   Patient presents with:  Cough: x 2-3 days      HPI:   Brennen Carlin is a 66year old male who presents to clinic today with complaints of cough for 2-3 days. Has a non-productive cough. Has a \"rattle\" when he coughs.   Feels 1966        Quit date: 1985        Years since quittin.1      Smokeless tobacco: Never Used    Substance and Sexual Activity      Alcohol use:  Yes        Alcohol/week: 1.0 standard drinks        Types: 1 Cans of beer per week      Drug use: No old male who presents with symptoms which are consistent with:    ASSESSMENT:  Copd exacerbation (hcc)  (primary encounter diagnosis)  Restart Symbicort 2 puffs twice a day.   Azithromycin for chest coverage, patient with low-grade fever here in office and any questions regarding this note.

## 2020-02-24 NOTE — PATIENT INSTRUCTIONS
Restart your Symbicort 2 puffs twice a day, rinse your mouth out after each use. Azithromycin 2 tablets today then one tablet daily for an additional four days. Avoid dairy products while coughing. Mucinex for cough.   Follow up in 2-3 days or sooner

## 2020-03-03 ENCOUNTER — TELEPHONE (OUTPATIENT)
Dept: FAMILY MEDICINE CLINIC | Facility: CLINIC | Age: 75
End: 2020-03-03

## 2020-03-03 NOTE — TELEPHONE ENCOUNTER
Patient questioned if he could cancel tomorrow's follow up appt with  and be seen for  COPD follow up at time of Medicare Px   Later this month. Advised to keep tomorrows appt for follow up and to keep Medicare Px later this month/agreed.   St

## 2020-03-04 ENCOUNTER — OFFICE VISIT (OUTPATIENT)
Dept: FAMILY MEDICINE CLINIC | Facility: CLINIC | Age: 75
End: 2020-03-04
Payer: MEDICARE

## 2020-03-04 VITALS
OXYGEN SATURATION: 96 % | SYSTOLIC BLOOD PRESSURE: 130 MMHG | HEART RATE: 75 BPM | BODY MASS INDEX: 26.67 KG/M2 | WEIGHT: 178 LBS | TEMPERATURE: 97 F | HEIGHT: 68.5 IN | RESPIRATION RATE: 18 BRPM | DIASTOLIC BLOOD PRESSURE: 80 MMHG

## 2020-03-04 DIAGNOSIS — R06.2 WHEEZING: ICD-10-CM

## 2020-03-04 DIAGNOSIS — N18.30 CKD (CHRONIC KIDNEY DISEASE) STAGE 3, GFR 30-59 ML/MIN (HCC): ICD-10-CM

## 2020-03-04 DIAGNOSIS — Z13.6 ENCOUNTER FOR SCREENING FOR CARDIOVASCULAR DISORDERS: ICD-10-CM

## 2020-03-04 DIAGNOSIS — C43.9 MELANOMA OF SKIN (HCC): ICD-10-CM

## 2020-03-04 DIAGNOSIS — Z12.5 ENCOUNTER FOR SCREENING FOR MALIGNANT NEOPLASM OF PROSTATE: ICD-10-CM

## 2020-03-04 DIAGNOSIS — T45.515A WARFARIN-INDUCED COAGULOPATHY (HCC): ICD-10-CM

## 2020-03-04 DIAGNOSIS — I48.0 PAROXYSMAL ATRIAL FIBRILLATION (HCC): ICD-10-CM

## 2020-03-04 DIAGNOSIS — J44.1 ACUTE EXACERBATION OF CHRONIC OBSTRUCTIVE PULMONARY DISEASE (COPD) (HCC): Primary | ICD-10-CM

## 2020-03-04 DIAGNOSIS — D68.32 WARFARIN-INDUCED COAGULOPATHY (HCC): ICD-10-CM

## 2020-03-04 PROCEDURE — 99213 OFFICE O/P EST LOW 20 MIN: CPT | Performed by: FAMILY MEDICINE

## 2020-03-04 NOTE — PROGRESS NOTES
[address]  PROGRESS NOTE  Chief Complaint:   Patient presents with: Follow - Up      HPI:   This is a 66year old male coming in for follow-up on his respiratory infection.   He said that he is still coughing but it is much better than and left nephrectomy 9/27/18     Social History:  Social History    Tobacco Use      Smoking status: Former Smoker        Packs/day: 1.00        Years: 21.00        Pack years: 21        Types: Cigarettes        Start date: 1/1/1966        Quit date: 1/1/1 well groomed. Physical Exam:  GEN:  Patient is alert, awake and oriented, well developed, well nourished, no apparent distress.   HEENT:  Head:  Normocephalic, atraumatic Eyes: EOMI, PERRLA, no scleral icterus, conjunctivae clear bilaterally, no eye discha stage 3, GFR 30-59 ml/min (HCC)  He has chronic kidney disease stage III. - CBC WITH DIFFERENTIAL WITH PLATELET; Future  - COMP METABOLIC PANEL (14); Future  - LIPID PANEL;  Future  - PSA SCREEN; Future  - ASSAY, THYROID STIM HORMONE; Future  - URIC ACID, ([address]     Acute posthemorrhagic anemia     Anemia     Leukocytosis     Current use of long term anticoagulation     Renal insufficiency syndrome     Spleen hematoma     Warfarin-induced coagulopathy (HCC)     Tinea cruris     Abdominal pain     Chest pain

## 2020-03-13 ENCOUNTER — LABORATORY ENCOUNTER (OUTPATIENT)
Dept: LAB | Age: 75
End: 2020-03-13
Attending: FAMILY MEDICINE
Payer: MEDICARE

## 2020-03-13 DIAGNOSIS — M15.9 PRIMARY OSTEOARTHRITIS INVOLVING MULTIPLE JOINTS: ICD-10-CM

## 2020-03-13 DIAGNOSIS — N18.30 CKD (CHRONIC KIDNEY DISEASE) STAGE 3, GFR 30-59 ML/MIN (HCC): ICD-10-CM

## 2020-03-13 DIAGNOSIS — D68.32 WARFARIN-INDUCED COAGULOPATHY (HCC): ICD-10-CM

## 2020-03-13 DIAGNOSIS — J44.1 ACUTE EXACERBATION OF CHRONIC OBSTRUCTIVE PULMONARY DISEASE (COPD) (HCC): ICD-10-CM

## 2020-03-13 DIAGNOSIS — T45.515A WARFARIN-INDUCED COAGULOPATHY (HCC): ICD-10-CM

## 2020-03-13 DIAGNOSIS — R29.898 WEAKNESS OF BOTH LOWER EXTREMITIES: ICD-10-CM

## 2020-03-13 DIAGNOSIS — Z13.6 ENCOUNTER FOR SCREENING FOR CARDIOVASCULAR DISORDERS: ICD-10-CM

## 2020-03-13 DIAGNOSIS — I48.0 PAROXYSMAL ATRIAL FIBRILLATION (HCC): ICD-10-CM

## 2020-03-13 DIAGNOSIS — I48.21 PERMANENT ATRIAL FIBRILLATION (HCC): ICD-10-CM

## 2020-03-13 DIAGNOSIS — Z12.5 ENCOUNTER FOR SCREENING FOR MALIGNANT NEOPLASM OF PROSTATE: ICD-10-CM

## 2020-03-13 LAB
ALBUMIN SERPL-MCNC: 3.7 G/DL (ref 3.4–5)
ALBUMIN/GLOB SERPL: 1 {RATIO} (ref 1–2)
ALP LIVER SERPL-CCNC: 59 U/L (ref 45–117)
ALT SERPL-CCNC: 43 U/L (ref 16–61)
ANION GAP SERPL CALC-SCNC: 5 MMOL/L (ref 0–18)
AST SERPL-CCNC: 40 U/L (ref 15–37)
BASOPHILS # BLD AUTO: 0.11 X10(3) UL (ref 0–0.2)
BASOPHILS NFR BLD AUTO: 1.3 %
BILIRUB SERPL-MCNC: 1.6 MG/DL (ref 0.1–2)
BUN BLD-MCNC: 20 MG/DL (ref 7–18)
BUN/CREAT SERPL: 15.5 (ref 10–20)
CALCIUM BLD-MCNC: 9.3 MG/DL (ref 8.5–10.1)
CHLORIDE SERPL-SCNC: 108 MMOL/L (ref 98–112)
CHOLEST SMN-MCNC: 153 MG/DL (ref ?–200)
CO2 SERPL-SCNC: 26 MMOL/L (ref 21–32)
COMPLEXED PSA SERPL-MCNC: 13 NG/ML (ref ?–4)
CREAT BLD-MCNC: 1.29 MG/DL (ref 0.7–1.3)
DEPRECATED RDW RBC AUTO: 48.5 FL (ref 35.1–46.3)
EOSINOPHIL # BLD AUTO: 0.13 X10(3) UL (ref 0–0.7)
EOSINOPHIL NFR BLD AUTO: 1.6 %
ERYTHROCYTE [DISTWIDTH] IN BLOOD BY AUTOMATED COUNT: 13.7 % (ref 11–15)
GLOBULIN PLAS-MCNC: 3.8 G/DL (ref 2.8–4.4)
GLUCOSE BLD-MCNC: 80 MG/DL (ref 70–99)
HCT VFR BLD AUTO: 46.9 % (ref 39–53)
HDLC SERPL-MCNC: 65 MG/DL (ref 40–59)
HGB BLD-MCNC: 15.5 G/DL (ref 13–17.5)
IMM GRANULOCYTES # BLD AUTO: 0.03 X10(3) UL (ref 0–1)
IMM GRANULOCYTES NFR BLD: 0.4 %
LDLC SERPL CALC-MCNC: 76 MG/DL (ref ?–100)
LYMPHOCYTES # BLD AUTO: 3.13 X10(3) UL (ref 1–4)
LYMPHOCYTES NFR BLD AUTO: 37.4 %
M PROTEIN MFR SERPL ELPH: 7.5 G/DL (ref 6.4–8.2)
MCH RBC QN AUTO: 32.2 PG (ref 26–34)
MCHC RBC AUTO-ENTMCNC: 33 G/DL (ref 31–37)
MCV RBC AUTO: 97.3 FL (ref 80–100)
MONOCYTES # BLD AUTO: 1.33 X10(3) UL (ref 0.1–1)
MONOCYTES NFR BLD AUTO: 15.9 %
NEUTROPHILS # BLD AUTO: 3.63 X10 (3) UL (ref 1.5–7.7)
NEUTROPHILS # BLD AUTO: 3.63 X10(3) UL (ref 1.5–7.7)
NEUTROPHILS NFR BLD AUTO: 43.4 %
NONHDLC SERPL-MCNC: 88 MG/DL (ref ?–130)
OSMOLALITY SERPL CALC.SUM OF ELEC: 290 MOSM/KG (ref 275–295)
PATIENT FASTING Y/N/NP: YES
PATIENT FASTING Y/N/NP: YES
PLATELET # BLD AUTO: 406 10(3)UL (ref 150–450)
POTASSIUM SERPL-SCNC: 4 MMOL/L (ref 3.5–5.1)
RBC # BLD AUTO: 4.82 X10(6)UL (ref 3.8–5.8)
SODIUM SERPL-SCNC: 139 MMOL/L (ref 136–145)
TRIGL SERPL-MCNC: 58 MG/DL (ref 30–149)
TSI SER-ACNC: 1.51 MIU/ML (ref 0.36–3.74)
URATE SERPL-MCNC: 4.5 MG/DL (ref 3.5–7.2)
VLDLC SERPL CALC-MCNC: 12 MG/DL (ref 0–30)
WBC # BLD AUTO: 8.4 X10(3) UL (ref 4–11)

## 2020-03-13 PROCEDURE — 36415 COLL VENOUS BLD VENIPUNCTURE: CPT

## 2020-03-13 PROCEDURE — 80061 LIPID PANEL: CPT

## 2020-03-13 PROCEDURE — 85025 COMPLETE CBC W/AUTO DIFF WBC: CPT

## 2020-03-13 PROCEDURE — 84443 ASSAY THYROID STIM HORMONE: CPT

## 2020-03-13 PROCEDURE — 84550 ASSAY OF BLOOD/URIC ACID: CPT

## 2020-03-13 PROCEDURE — 80053 COMPREHEN METABOLIC PANEL: CPT

## 2020-05-18 ENCOUNTER — OFFICE VISIT (OUTPATIENT)
Dept: FAMILY MEDICINE CLINIC | Facility: CLINIC | Age: 75
End: 2020-05-18
Payer: MEDICARE

## 2020-05-18 VITALS
RESPIRATION RATE: 16 BRPM | SYSTOLIC BLOOD PRESSURE: 132 MMHG | TEMPERATURE: 98 F | BODY MASS INDEX: 27.54 KG/M2 | HEART RATE: 77 BPM | HEIGHT: 68.5 IN | OXYGEN SATURATION: 98 % | DIASTOLIC BLOOD PRESSURE: 88 MMHG | WEIGHT: 183.81 LBS

## 2020-05-18 DIAGNOSIS — Z90.81 H/O SPLENECTOMY: ICD-10-CM

## 2020-05-18 DIAGNOSIS — Z86.718 HISTORY OF DVT (DEEP VEIN THROMBOSIS): ICD-10-CM

## 2020-05-18 DIAGNOSIS — C43.9 MELANOMA OF SKIN (HCC): ICD-10-CM

## 2020-05-18 DIAGNOSIS — T45.515A WARFARIN-INDUCED COAGULOPATHY (HCC): ICD-10-CM

## 2020-05-18 DIAGNOSIS — M79.10 MYALGIA: ICD-10-CM

## 2020-05-18 DIAGNOSIS — R29.898 WEAKNESS OF BOTH LOWER EXTREMITIES: ICD-10-CM

## 2020-05-18 DIAGNOSIS — R35.1 BENIGN PROSTATIC HYPERPLASIA WITH NOCTURIA: ICD-10-CM

## 2020-05-18 DIAGNOSIS — D68.32 WARFARIN-INDUCED COAGULOPATHY (HCC): ICD-10-CM

## 2020-05-18 DIAGNOSIS — M15.9 PRIMARY OSTEOARTHRITIS INVOLVING MULTIPLE JOINTS: ICD-10-CM

## 2020-05-18 DIAGNOSIS — R06.2 WHEEZING: ICD-10-CM

## 2020-05-18 DIAGNOSIS — J44.1 ACUTE EXACERBATION OF CHRONIC OBSTRUCTIVE PULMONARY DISEASE (COPD) (HCC): ICD-10-CM

## 2020-05-18 DIAGNOSIS — N40.1 BENIGN PROSTATIC HYPERPLASIA WITH NOCTURIA: ICD-10-CM

## 2020-05-18 DIAGNOSIS — I48.21 PERMANENT ATRIAL FIBRILLATION (HCC): ICD-10-CM

## 2020-05-18 DIAGNOSIS — N18.30 CKD (CHRONIC KIDNEY DISEASE) STAGE 3, GFR 30-59 ML/MIN (HCC): ICD-10-CM

## 2020-05-18 DIAGNOSIS — Z00.00 ENCOUNTER FOR ANNUAL HEALTH EXAMINATION: Primary | ICD-10-CM

## 2020-05-18 DIAGNOSIS — Z90.2 S/P LOBECTOMY OF LUNG: ICD-10-CM

## 2020-05-18 DIAGNOSIS — R97.20 ELEVATED PROSTATE SPECIFIC ANTIGEN (PSA): ICD-10-CM

## 2020-05-18 PROBLEM — N17.0 ACUTE KIDNEY FAILURE WITH LESION OF TUBULAR NECROSIS (HCC): Status: RESOLVED | Noted: 2018-08-06 | Resolved: 2020-05-18

## 2020-05-18 PROCEDURE — 99213 OFFICE O/P EST LOW 20 MIN: CPT | Performed by: FAMILY MEDICINE

## 2020-05-18 PROCEDURE — G0439 PPPS, SUBSEQ VISIT: HCPCS | Performed by: FAMILY MEDICINE

## 2020-05-18 RX ORDER — BUDESONIDE AND FORMOTEROL FUMARATE DIHYDRATE 160; 4.5 UG/1; UG/1
2 AEROSOL RESPIRATORY (INHALATION) 2 TIMES DAILY
COMMUNITY
Start: 2020-03-23

## 2020-05-18 NOTE — PROGRESS NOTES
HPI:   Joey Dunham is a 66year old male who presents for a  Medicare annual wellness visit. His last annual assessment has been over 1 year: Annual Physical due on 06/29/2019       He said that overall he is doing pretty well.   He gets frustrated w years: 24        Types: Cigarettes        Start date: 1964        Quit date: 1985        Years since quittin.4      Smokeless tobacco: Never Used         CAGE Alcohol screening   Candice Flaherty was screened for Alcohol abuse and had a score (80.7 kg)  02/24/20 : 177 lb (80.3 kg)     Last Cholesterol Labs:   Lab Results   Component Value Date    CHOLEST 153 03/13/2020    HDL 65 (H) 03/13/2020    LDL 76 03/13/2020    TRIG 58 03/13/2020          Last Chemistry Labs:   Lab Results   Component Mildred reports that he does not use drugs. REVIEW OF SYSTEMS:   Review of Systems   Constitutional: Negative. HENT: Negative. Eyes: Negative. Respiratory: Positive for cough and wheezing. Cardiovascular: Negative. Gastrointestinal: Negative. ([facility]  -     OFFICE/OUTPT VISIT,EST,LEVL III    Wheezing  -     OFFICE/OUTPT VISIT,EST,LEVL III    Acute exacerbation of chronic obstructive pulmonary disease (COPD) (HCC)  -     OFFICE/OUTPT VISIT,EST,LEVL III    Weakness of both lower extremities  - A1C    No flowsheet data found.     Fasting Blood Sugar (FSB)Annually Glucose (mg/dL)   Date Value   03/13/2020 80   08/14/2018 99       Cardiovascular Disease Screening     LDL Annually LDL Cholesterol (mg/dL)   Date Value   03/13/2020 76        EKG - w/ I This may be covered with your pharmacy  prescription benefits      SPECIFIC DISEASE MONITORING Internal Lab or Procedure External Lab or Procedure            COPD      Spirometry Testing Annually Spirometry date:  No flowsheet data found.

## 2020-05-18 NOTE — PATIENT INSTRUCTIONS
Recommended Websites for Advanced Directives    [email or web address]no. org/publications/Documents/personal_dec. pdf  An information packet, including necessary form from the [address] website. http://[email or web address] [place]state. [place]/public/books/adv

## 2021-02-01 DIAGNOSIS — Z23 NEED FOR VACCINATION: ICD-10-CM

## 2021-05-19 ENCOUNTER — LAB ENCOUNTER (OUTPATIENT)
Dept: LAB | Age: 76
End: 2021-05-19
Attending: FAMILY MEDICINE
Payer: MEDICARE

## 2021-05-19 ENCOUNTER — OFFICE VISIT (OUTPATIENT)
Dept: FAMILY MEDICINE CLINIC | Facility: CLINIC | Age: 76
End: 2021-05-19
Payer: MEDICARE

## 2021-05-19 VITALS
TEMPERATURE: 97 F | WEIGHT: 183 LBS | DIASTOLIC BLOOD PRESSURE: 60 MMHG | SYSTOLIC BLOOD PRESSURE: 116 MMHG | HEART RATE: 72 BPM | OXYGEN SATURATION: 96 % | HEIGHT: 68.5 IN | BODY MASS INDEX: 27.42 KG/M2 | RESPIRATION RATE: 16 BRPM

## 2021-05-19 DIAGNOSIS — M15.9 PRIMARY OSTEOARTHRITIS INVOLVING MULTIPLE JOINTS: ICD-10-CM

## 2021-05-19 DIAGNOSIS — Z00.00 ENCOUNTER FOR ANNUAL HEALTH EXAMINATION: Primary | ICD-10-CM

## 2021-05-19 DIAGNOSIS — N40.1 BENIGN PROSTATIC HYPERPLASIA WITH NOCTURIA: ICD-10-CM

## 2021-05-19 DIAGNOSIS — N18.31 STAGE 3A CHRONIC KIDNEY DISEASE (HCC): ICD-10-CM

## 2021-05-19 DIAGNOSIS — D50.0 IRON DEFICIENCY ANEMIA DUE TO CHRONIC BLOOD LOSS: ICD-10-CM

## 2021-05-19 DIAGNOSIS — R91.1 LUNG NODULE: ICD-10-CM

## 2021-05-19 DIAGNOSIS — Z12.5 SPECIAL SCREENING FOR MALIGNANT NEOPLASM OF PROSTATE: ICD-10-CM

## 2021-05-19 DIAGNOSIS — R97.20 ELEVATED PROSTATE SPECIFIC ANTIGEN (PSA): ICD-10-CM

## 2021-05-19 DIAGNOSIS — Z79.01 LONG TERM (CURRENT) USE OF ANTICOAGULANTS: ICD-10-CM

## 2021-05-19 DIAGNOSIS — I35.9 AORTIC VALVE DISORDER: ICD-10-CM

## 2021-05-19 DIAGNOSIS — Z86.718 HISTORY OF DVT (DEEP VEIN THROMBOSIS): ICD-10-CM

## 2021-05-19 DIAGNOSIS — Z90.2 S/P LOBECTOMY OF LUNG: ICD-10-CM

## 2021-05-19 DIAGNOSIS — Z85.820 HISTORY OF MELANOMA: ICD-10-CM

## 2021-05-19 DIAGNOSIS — I05.9 MITRAL VALVE DISEASE: ICD-10-CM

## 2021-05-19 DIAGNOSIS — Z98.890 STATUS POST MITRAL VALVE REPAIR: ICD-10-CM

## 2021-05-19 DIAGNOSIS — R33.9 INCOMPLETE BLADDER EMPTYING: ICD-10-CM

## 2021-05-19 DIAGNOSIS — Z90.5 ACQUIRED ABSENCE OF KIDNEY: ICD-10-CM

## 2021-05-19 DIAGNOSIS — I48.21 PERMANENT ATRIAL FIBRILLATION (HCC): ICD-10-CM

## 2021-05-19 DIAGNOSIS — R35.1 BENIGN PROSTATIC HYPERPLASIA WITH NOCTURIA: ICD-10-CM

## 2021-05-19 DIAGNOSIS — Z90.81 HX OF SPLENECTOMY: ICD-10-CM

## 2021-05-19 DIAGNOSIS — Z13.6 SCREENING FOR CARDIOVASCULAR CONDITION: ICD-10-CM

## 2021-05-19 DIAGNOSIS — M54.50 ACUTE MIDLINE LOW BACK PAIN WITHOUT SCIATICA: ICD-10-CM

## 2021-05-19 PROBLEM — R53.81 GENERAL BODY DETERIORATION: Status: RESOLVED | Noted: 2017-01-18 | Resolved: 2021-05-19

## 2021-05-19 PROBLEM — D72.829 LEUKOCYTOSIS: Status: RESOLVED | Noted: 2018-08-15 | Resolved: 2021-05-19

## 2021-05-19 PROBLEM — D68.32 WARFARIN-INDUCED COAGULOPATHY: Status: RESOLVED | Noted: 2018-09-22 | Resolved: 2021-05-19

## 2021-05-19 PROBLEM — S36.029A SPLEEN HEMATOMA: Status: RESOLVED | Noted: 2018-09-22 | Resolved: 2021-05-19

## 2021-05-19 PROBLEM — R06.2 WHEEZING: Status: RESOLVED | Noted: 2018-11-28 | Resolved: 2021-05-19

## 2021-05-19 PROBLEM — J98.4: Status: RESOLVED | Noted: 2017-06-28 | Resolved: 2021-05-19

## 2021-05-19 PROBLEM — D49.2 SOLITARY FIBROUS TUMOR: Status: RESOLVED | Noted: 2019-02-22 | Resolved: 2021-05-19

## 2021-05-19 PROBLEM — N28.9 RENAL INSUFFICIENCY SYNDROME: Status: RESOLVED | Noted: 2018-08-15 | Resolved: 2021-05-19

## 2021-05-19 PROBLEM — K29.30 CHRONIC SUPERFICIAL GASTRITIS WITHOUT BLEEDING: Status: RESOLVED | Noted: 2019-12-11 | Resolved: 2021-05-19

## 2021-05-19 PROBLEM — D62 ACUTE POSTHEMORRHAGIC ANEMIA: Status: RESOLVED | Noted: 2018-09-22 | Resolved: 2021-05-19

## 2021-05-19 PROBLEM — R07.9 CHEST PAIN: Status: RESOLVED | Noted: 2018-09-22 | Resolved: 2021-05-19

## 2021-05-19 PROBLEM — K43.2 INCISIONAL HERNIA, WITHOUT OBSTRUCTION OR GANGRENE: Status: RESOLVED | Noted: 2019-12-06 | Resolved: 2021-05-19

## 2021-05-19 PROBLEM — T45.515A WARFARIN-INDUCED COAGULOPATHY (HCC): Status: RESOLVED | Noted: 2018-09-22 | Resolved: 2021-05-19

## 2021-05-19 PROBLEM — M79.10 MYALGIA: Status: RESOLVED | Noted: 2019-03-19 | Resolved: 2021-05-19

## 2021-05-19 PROBLEM — T45.515A WARFARIN-INDUCED COAGULOPATHY: Status: RESOLVED | Noted: 2018-09-22 | Resolved: 2021-05-19

## 2021-05-19 PROBLEM — B35.6 TINEA CRURIS: Status: RESOLVED | Noted: 2018-09-27 | Resolved: 2021-05-19

## 2021-05-19 PROBLEM — J90 PLEURAL EFFUSION, NOT ELSEWHERE CLASSIFIED: Status: RESOLVED | Noted: 2019-06-07 | Resolved: 2021-05-19

## 2021-05-19 PROBLEM — R10.9 ABDOMINAL PAIN: Status: RESOLVED | Noted: 2018-09-22 | Resolved: 2021-05-19

## 2021-05-19 PROBLEM — D68.32 WARFARIN-INDUCED COAGULOPATHY (HCC): Status: RESOLVED | Noted: 2018-09-22 | Resolved: 2021-05-19

## 2021-05-19 PROBLEM — R29.898 WEAKNESS OF BOTH LOWER EXTREMITIES: Status: RESOLVED | Noted: 2019-03-19 | Resolved: 2021-05-19

## 2021-05-19 PROBLEM — J44.1 ACUTE EXACERBATION OF CHRONIC OBSTRUCTIVE PULMONARY DISEASE (COPD) (HCC): Status: RESOLVED | Noted: 2019-06-07 | Resolved: 2021-05-19

## 2021-05-19 PROCEDURE — 36415 COLL VENOUS BLD VENIPUNCTURE: CPT | Performed by: FAMILY MEDICINE

## 2021-05-19 PROCEDURE — G0439 PPPS, SUBSEQ VISIT: HCPCS | Performed by: FAMILY MEDICINE

## 2021-05-19 PROCEDURE — 80053 COMPREHEN METABOLIC PANEL: CPT | Performed by: FAMILY MEDICINE

## 2021-05-19 PROCEDURE — 85025 COMPLETE CBC W/AUTO DIFF WBC: CPT | Performed by: FAMILY MEDICINE

## 2021-05-19 PROCEDURE — 99213 OFFICE O/P EST LOW 20 MIN: CPT | Performed by: FAMILY MEDICINE

## 2021-05-19 PROCEDURE — 84443 ASSAY THYROID STIM HORMONE: CPT | Performed by: FAMILY MEDICINE

## 2021-05-19 PROCEDURE — 80061 LIPID PANEL: CPT | Performed by: FAMILY MEDICINE

## 2021-05-19 NOTE — PROGRESS NOTES
REASON FOR VISIT:    Yeyo Julio is a 66year old male who presents for a Medicare Annual Wellness visit. He said that he feels like he is doing pretty well overall. He feels like his strength is slowly returning.   He is able to do chores on th 160-4.5 MCG/ACT Inhalation Aerosol Inhale 2 puffs into the lungs 2 (two) times a day. • metoprolol Tartrate 25 MG Oral Tab Take 25 mg by mouth 2 (two) times daily. 3   • XARELTO 20 MG Oral Tab Take 1 tablet by mouth daily.   3   • DilTIAZem HCl ER Coat help  Preparing your meals: Able without help  Managing money/bills: Able without help  Taking medications as prescribed: Able without help  Are you able to afford your medications?: Yes  Hearing Problems?: No     Functional Status     Hearing Problems?: N COMMENTS)    Comment:Joint pain  Strawberries              MEDICAL INFORMATION:   Past Medical History:   Diagnosis Date   • Anxiety    • Aortic regurgitation    • Arthritis    • Atrial fibrillation ([facility] 2014   • BPH (benign prostatic hyperplasia)    • Ca exertion  GI: denies abdominal pain, denies heartburn  : denies nocturia or changes in stream  MUSCULOSKELETAL: denies back pain  NEURO: denies headaches  PSYCHE: denies depression or anxiety  HEMATOLOGIC: denies hx of anemia  ENDOCRINE: denies thyroid h PLATELET; Future  - PSA SCREEN  - LIPID PANEL  - COMP METABOLIC PANEL (14)  - TSH W REFLEX TO FREE T4  - CBC WITH DIFFERENTIAL WITH PLATELET    3. Stage 3a chronic kidney disease (HCC)  Unchanged.   He is due for blood work today.  - OFFICE/OUTPT VISIT,EST, pain.  It flares occasionally. It is not bothering him now.  - OFFICE/OUTPT VISIT,EST,LEVL III    10. Benign prostatic hyperplasia with nocturia  He has severe prostatic hyperplasia. No new treatment recommended now.   Check a PSA level today.  - OFFICE/O III    18. S/P lobectomy of lung  He is status post a lobectomy. He is doing well. - OFFICE/OUTPT VISIT,EST,LEVL III    19.  Long term (current) use of anticoagulants  He continues to take Xarelto regularly with no complications.  - OFFICE/OUTPT VISIT,EST

## 2021-05-19 NOTE — PATIENT INSTRUCTIONS
Recommended Websites for Advanced Directives    Seek[employer]umni[employer]no. org/publications/Documents/personal_dec. pdf  An information packet, including necessary form from the [address] website. http://www. [place]. [place]/public/books/adv

## 2021-05-21 ENCOUNTER — TELEPHONE (OUTPATIENT)
Dept: FAMILY MEDICINE CLINIC | Facility: CLINIC | Age: 76
End: 2021-05-21

## 2021-05-21 NOTE — TELEPHONE ENCOUNTER
----- Message from Branden Rowe MD sent at 5/21/2021 12:36 PM CDT -----  PSA is elevated at 11.3 but less than it was one year ago when it was 13.0. Cholesterol is 162. Blood sugar is normal at 90.   Kidney function is slightly diminished with a GFR

## 2021-05-21 NOTE — TELEPHONE ENCOUNTER
I have spoke to the pt and I have informed him of the below results. Pt has no further questions at this time.

## 2021-08-25 ENCOUNTER — TELEPHONE (OUTPATIENT)
Dept: FAMILY MEDICINE CLINIC | Facility: CLINIC | Age: 76
End: 2021-08-25

## 2021-08-25 ENCOUNTER — OFFICE VISIT (OUTPATIENT)
Dept: FAMILY MEDICINE CLINIC | Facility: CLINIC | Age: 76
End: 2021-08-25
Payer: COMMERCIAL

## 2021-08-25 VITALS
SYSTOLIC BLOOD PRESSURE: 118 MMHG | HEART RATE: 101 BPM | TEMPERATURE: 102 F | DIASTOLIC BLOOD PRESSURE: 70 MMHG | OXYGEN SATURATION: 95 %

## 2021-08-25 DIAGNOSIS — I48.21 PERMANENT ATRIAL FIBRILLATION (HCC): ICD-10-CM

## 2021-08-25 DIAGNOSIS — R50.9 FEVER, UNSPECIFIED FEVER CAUSE: ICD-10-CM

## 2021-08-25 DIAGNOSIS — R05.9 COUGH IN ADULT: Primary | ICD-10-CM

## 2021-08-25 PROBLEM — I35.1 MILD AORTIC INSUFFICIENCY: Status: ACTIVE | Noted: 2021-08-25

## 2021-08-25 PROCEDURE — 3074F SYST BP LT 130 MM HG: CPT | Performed by: NURSE PRACTITIONER

## 2021-08-25 PROCEDURE — 3078F DIAST BP <80 MM HG: CPT | Performed by: NURSE PRACTITIONER

## 2021-08-25 PROCEDURE — 99213 OFFICE O/P EST LOW 20 MIN: CPT | Performed by: NURSE PRACTITIONER

## 2021-08-25 RX ORDER — AMOXICILLIN AND CLAVULANATE POTASSIUM 875; 125 MG/1; MG/1
1 TABLET, FILM COATED ORAL 2 TIMES DAILY
Qty: 20 TABLET | Refills: 0 | Status: SHIPPED | OUTPATIENT
Start: 2021-08-25 | End: 2021-09-04

## 2021-08-25 NOTE — PATIENT INSTRUCTIONS
Directed to take antibiotics until gone. Recommend to eat with the antibiotic. Treat symptoms as needed. Tylenol for the fever. Covid test pending - quarantine until results. Return to clinic if not better in 48-72 hours.   Otherwise follow-up as need

## 2021-08-25 NOTE — TELEPHONE ENCOUNTER
He definitely needs evaluation for potential Covid disease. Agree with referral to the respiratory clinic. If his heart symptoms worsen he should go to the emergency room.

## 2021-08-25 NOTE — TELEPHONE ENCOUNTER
Patient was made aware of the all the below at during the initial phone call.      Future Appointments   Date Time Provider Amalia Alfonso   8/25/2021  2:00 PM ELICEO Dubon [facility] [place] [facility]

## 2021-08-25 NOTE — TELEPHONE ENCOUNTER
Patient c/o cough for 2-3 days now. Patient states last time he had a cough he got antibiotics and it \"knocked it right out. \"  States this was back when he had his lung surgery. Patient also states he has A. Fib but it is normally asymptomatic.   Sae Encarnacion

## 2021-08-25 NOTE — PROGRESS NOTES
HPI:    Patient ID: Eduin Peacock is a 64year old male. HPI    Respiratory Clinic    Patient is present with concern about a cough that started 2 days ago. States that he has had similar for symptoms in the past but not usually with a fever.   He d ear canal and external ear normal.      Nose: Nose normal.      Mouth/Throat:      Pharynx: Oropharynx is clear. No posterior oropharyngeal erythema. Eyes:      Conjunctiva/sclera: Conjunctivae normal.   Neck:      Thyroid: No thyromegaly.    Azucena Renteria

## 2021-08-26 ENCOUNTER — TELEPHONE (OUTPATIENT)
Dept: FAMILY MEDICINE CLINIC | Facility: CLINIC | Age: 76
End: 2021-08-26

## 2021-08-26 LAB — SARS-COV-2 RNA RESP QL NAA+PROBE: DETECTED

## 2021-08-26 NOTE — TELEPHONE ENCOUNTER
Spoke with patient regarding the below. Informed patient there is an option of monoclonal antibody treatment and to let us know if he decides he wants this treatment. Encouraged fluids and rest and to call back to condition update.  Patient states he is fee

## 2021-08-26 NOTE — TELEPHONE ENCOUNTER
----- Message from ELICEO Huston sent at 8/26/2021  3:58 PM CDT -----  Covid is positive. Please let patient know. He needs to quarantine for 10 days from the onset of his symptoms. Recommend to get a pulse oximeter to check O2 sats regularly.

## 2021-08-28 ENCOUNTER — TELEPHONE (OUTPATIENT)
Dept: FAMILY MEDICINE CLINIC | Facility: CLINIC | Age: 76
End: 2021-08-28

## 2021-08-28 DIAGNOSIS — U07.1 COVID-19 VIRUS DETECTED: Primary | ICD-10-CM

## 2021-08-28 NOTE — TELEPHONE ENCOUNTER
Called patient to get an update on how he is feeling. States that he is still fighting the fever. The cough is much improved. He has been watching his oxygen level.   He has only dropped down to 93% as his lowest.    Discussed with him about the monoclon

## 2021-08-30 ENCOUNTER — TELEPHONE (OUTPATIENT)
Dept: FAMILY MEDICINE CLINIC | Facility: CLINIC | Age: 76
End: 2021-08-30

## 2021-09-04 ENCOUNTER — TELEPHONE (OUTPATIENT)
Dept: FAMILY MEDICINE CLINIC | Facility: CLINIC | Age: 76
End: 2021-09-04

## 2021-09-04 DIAGNOSIS — R06.02 SHORTNESS OF BREATH: Primary | ICD-10-CM

## 2021-09-04 RX ORDER — ALBUTEROL SULFATE 90 UG/1
2 AEROSOL, METERED RESPIRATORY (INHALATION) EVERY 4 HOURS PRN
Qty: 18 G | Refills: 0 | Status: SHIPPED | OUTPATIENT
Start: 2021-09-04

## 2021-09-04 NOTE — TELEPHONE ENCOUNTER
Patient called to ask if there is anything else he can take at home to help with fatigue and shortness of breath on exertion associated with covid infection. His O2 sats are 93-95% at this time.  He denies feeling short of breath at this time, but had troub

## 2021-09-04 NOTE — TELEPHONE ENCOUNTER
Pt calling and states he is now experiencing weakness, fatigue and SOB after being diagnosed with Covid. Call sent to nurse to triage.

## 2021-09-04 NOTE — TELEPHONE ENCOUNTER
Will add albuterol inhaler - 2 puffs every 4 hours and 10 -15 minutes before exertion. Make sure to rinse mouth after using. Prescription sent to [facility]. Follow up if not improving.

## 2021-09-09 ENCOUNTER — TELEPHONE (OUTPATIENT)
Dept: FAMILY MEDICINE CLINIC | Facility: CLINIC | Age: 76
End: 2021-09-09

## 2021-09-09 NOTE — TELEPHONE ENCOUNTER
Patient states he has 47 y/o Nephew who just moved to [facility]. States he was so weak today that he couldn't even open a zip lock bag. Patient states recently his nephew was admitted to [address] [place] [address] because he was so weak at his job he could barely stand.   Sta

## 2021-11-22 ENCOUNTER — TELEPHONE (OUTPATIENT)
Dept: FAMILY MEDICINE CLINIC | Facility: CLINIC | Age: 76
End: 2021-11-22

## 2021-11-22 NOTE — TELEPHONE ENCOUNTER
pt has a cough that he has see  for- was given a rx for amoxicillin clavulanate-wants to know if he can have another rx for this or does he need to be seen ?

## 2021-11-22 NOTE — TELEPHONE ENCOUNTER
Patient states he has a cough/runny nose. States he was on amoxicillin in the past for this. Patient informed will need evaluation. Informed there are not openings today at [facility].     Advised if needed can go to [facility] or call in the morning 8:

## 2021-11-23 NOTE — TELEPHONE ENCOUNTER
Phone patient-  There is not a respiratory clinic today. Patient states he is doing a little better. Patient informed there is a respiratory clinic tomorrow.   Advised to call first thing in the morning for an appointment for evaluation for sinus infect

## 2022-02-02 ENCOUNTER — PATIENT OUTREACH (OUTPATIENT)
Dept: FAMILY MEDICINE CLINIC | Facility: CLINIC | Age: 77
End: 2022-02-02

## 2022-04-13 ENCOUNTER — OFFICE VISIT (OUTPATIENT)
Dept: FAMILY MEDICINE CLINIC | Facility: CLINIC | Age: 77
End: 2022-04-13
Payer: COMMERCIAL

## 2022-04-13 VITALS
BODY MASS INDEX: 26.22 KG/M2 | RESPIRATION RATE: 18 BRPM | WEIGHT: 175 LBS | DIASTOLIC BLOOD PRESSURE: 60 MMHG | TEMPERATURE: 99 F | HEIGHT: 68.5 IN | HEART RATE: 78 BPM | SYSTOLIC BLOOD PRESSURE: 114 MMHG | OXYGEN SATURATION: 97 %

## 2022-04-13 DIAGNOSIS — R05.9 COUGH: Primary | ICD-10-CM

## 2022-04-13 PROCEDURE — 3074F SYST BP LT 130 MM HG: CPT | Performed by: NURSE PRACTITIONER

## 2022-04-13 PROCEDURE — 3078F DIAST BP <80 MM HG: CPT | Performed by: NURSE PRACTITIONER

## 2022-04-13 PROCEDURE — 3008F BODY MASS INDEX DOCD: CPT | Performed by: NURSE PRACTITIONER

## 2022-04-13 PROCEDURE — 99214 OFFICE O/P EST MOD 30 MIN: CPT | Performed by: NURSE PRACTITIONER

## 2022-04-13 RX ORDER — AMOXICILLIN 875 MG/1
875 TABLET, COATED ORAL 2 TIMES DAILY
Qty: 20 TABLET | Refills: 0 | Status: SHIPPED | OUTPATIENT
Start: 2022-04-13 | End: 2022-04-23

## 2022-04-13 RX ORDER — ALBUTEROL SULFATE 90 UG/1
2 AEROSOL, METERED RESPIRATORY (INHALATION) EVERY 4 HOURS PRN
Qty: 18 G | Refills: 0 | Status: SHIPPED | OUTPATIENT
Start: 2022-04-13

## 2022-04-13 RX ORDER — BUDESONIDE AND FORMOTEROL FUMARATE DIHYDRATE 160; 4.5 UG/1; UG/1
2 AEROSOL RESPIRATORY (INHALATION) 2 TIMES DAILY
Qty: 1 EACH | Refills: 0 | Status: SHIPPED | OUTPATIENT
Start: 2022-04-13

## 2022-04-13 RX ORDER — ZINC GLUCONATE 50 MG
1 TABLET ORAL DAILY
COMMUNITY

## 2022-04-13 NOTE — PATIENT INSTRUCTIONS
Restart your symbicort inhaler, ensure rinsing mouth afterward; use until improvement in cough  Refill of albuterol, use every 4 hours while awake today and tomorrow to see if this helps with cough  May use Mucinex DM for cough  Steam showers, humidifier by bedside  Deep breathing exercises 10 times an hour while awake as shown.   If no significant improvement in the next week or worsening of symptoms, consider covid testing and chest xray; notify the office if symptoms worsen

## 2022-06-06 ENCOUNTER — OFFICE VISIT (OUTPATIENT)
Dept: FAMILY MEDICINE CLINIC | Facility: CLINIC | Age: 77
End: 2022-06-06
Payer: COMMERCIAL

## 2022-06-06 VITALS
TEMPERATURE: 97 F | HEART RATE: 82 BPM | SYSTOLIC BLOOD PRESSURE: 118 MMHG | RESPIRATION RATE: 18 BRPM | OXYGEN SATURATION: 97 % | WEIGHT: 176 LBS | BODY MASS INDEX: 26.07 KG/M2 | DIASTOLIC BLOOD PRESSURE: 86 MMHG | HEIGHT: 69 IN

## 2022-06-06 DIAGNOSIS — M54.50 CHRONIC MIDLINE LOW BACK PAIN WITHOUT SCIATICA: ICD-10-CM

## 2022-06-06 DIAGNOSIS — Z79.01 LONG TERM (CURRENT) USE OF ANTICOAGULANTS: ICD-10-CM

## 2022-06-06 DIAGNOSIS — I48.21 PERMANENT ATRIAL FIBRILLATION (HCC): ICD-10-CM

## 2022-06-06 DIAGNOSIS — I05.9 MITRAL VALVE DISEASE: ICD-10-CM

## 2022-06-06 DIAGNOSIS — R91.1 LUNG NODULE: ICD-10-CM

## 2022-06-06 DIAGNOSIS — Z90.2 S/P LOBECTOMY OF LUNG: ICD-10-CM

## 2022-06-06 DIAGNOSIS — I35.9 AORTIC VALVE DISORDER: ICD-10-CM

## 2022-06-06 DIAGNOSIS — R33.9 INCOMPLETE BLADDER EMPTYING: ICD-10-CM

## 2022-06-06 DIAGNOSIS — G89.29 CHRONIC MIDLINE LOW BACK PAIN WITHOUT SCIATICA: ICD-10-CM

## 2022-06-06 DIAGNOSIS — N18.31 STAGE 3A CHRONIC KIDNEY DISEASE (HCC): ICD-10-CM

## 2022-06-06 DIAGNOSIS — N40.1 BENIGN PROSTATIC HYPERPLASIA WITH NOCTURIA: ICD-10-CM

## 2022-06-06 DIAGNOSIS — Z86.718 HISTORY OF DVT (DEEP VEIN THROMBOSIS): ICD-10-CM

## 2022-06-06 DIAGNOSIS — I35.1 MILD AORTIC INSUFFICIENCY: ICD-10-CM

## 2022-06-06 DIAGNOSIS — M15.9 PRIMARY OSTEOARTHRITIS INVOLVING MULTIPLE JOINTS: ICD-10-CM

## 2022-06-06 DIAGNOSIS — Z85.820 HISTORY OF MELANOMA: ICD-10-CM

## 2022-06-06 DIAGNOSIS — Z12.5 SPECIAL SCREENING FOR MALIGNANT NEOPLASM OF PROSTATE: ICD-10-CM

## 2022-06-06 DIAGNOSIS — Z98.890 STATUS POST MITRAL VALVE REPAIR: ICD-10-CM

## 2022-06-06 DIAGNOSIS — Z90.81 HX OF SPLENECTOMY: ICD-10-CM

## 2022-06-06 DIAGNOSIS — Z00.00 ENCOUNTER FOR ANNUAL HEALTH EXAMINATION: Primary | ICD-10-CM

## 2022-06-06 DIAGNOSIS — R97.20 ELEVATED PROSTATE SPECIFIC ANTIGEN (PSA): ICD-10-CM

## 2022-06-06 DIAGNOSIS — Z90.5 ACQUIRED ABSENCE OF KIDNEY: ICD-10-CM

## 2022-06-06 DIAGNOSIS — R35.1 BENIGN PROSTATIC HYPERPLASIA WITH NOCTURIA: ICD-10-CM

## 2022-06-06 PROBLEM — D64.9 ANEMIA: Status: RESOLVED | Noted: 2018-09-22 | Resolved: 2022-06-06

## 2022-06-06 NOTE — PATIENT INSTRUCTIONS
Please check labs at [facility] - To schedule an appointment for your  test please call [facility] patient scheduling at [phone]

## 2022-06-13 ENCOUNTER — TELEPHONE (OUTPATIENT)
Dept: FAMILY MEDICINE CLINIC | Facility: CLINIC | Age: 77
End: 2022-06-13

## 2022-06-13 LAB
ALBUMIN/GLOBULIN RATIO: 1.6
ALBUMIN: 4.3 G/DL
ALKALINE PHOSPHATASE, SERUM: 42 IU/L
ALT (SGPT): 14 IU/L
ANION GAP: 6
AST (SGOT): 20 IU/L
BILIRUBIN, TOTAL: 1.4 MG/DL
BUN/CREATININE RATIO: 16.8
BUN: 23
CALCIUM, SERUM: 9.4 MG/DL
CHLORIDE, SERUM: 105
CHOL/HDL RATIO: 2
CHOLESTEROL, TOTAL: 128 MG/DL
CO2: 27
CREATININE, SERUM: 1.37
EGFR IF NONAFRICN AM: 53
GLOBULIN: 2.6
GLUCOSE, SERUM: 88 MG/DL
HCT: 48
HDL CHOLESTEROL: 53 MG/DL
HGB: 15.7
LDL CHOLESTEROL: 64 MG/DL (ref ?–130)
MEAN CELL VOLUME: 98
MEAN CORPUSCULAR HEMOGLOBIN: 32
MEAN CORPUSCULAR HGB CONC: 33
MEAN PLATELET VOLUME: 386
NON HDL CHOL: 75
PLT: 386
POTASSIUM, SERUM: 4.3
PSA, TOTAL: 10.96
RED BLOOD COUNT: 4.9
RED CELL DISTRIBUTION WIDTH: 14
SODIUM, SERUM: 138
TOTAL PROTEIN,SERUM: 6.9
TRIGLYCERIDES: 56 MG/DL
TSH: 1.05 UIU/ML
WBC: 9.7

## 2022-06-13 NOTE — TELEPHONE ENCOUNTER
Addendum added June 13, 2022 at 10:30 AM.  PSA level is 10.96. PSA level 1 year ago was 11.3.  2 years ago it was 13.0. The PSA has decreased slightly. No new treatment recommended now.

## 2022-06-13 NOTE — TELEPHONE ENCOUNTER
----- Message from Cristina Abraham MD sent at 6/13/2022 10:18 AM CDT -----  Labs look good. Thyroid is normal.  CBC is normal.  Cholesterol is 128. Glucose is normal so no signs of diabetes. Kidney function is good. Impression: Labs look very good. No changes recommended now.

## 2022-06-14 NOTE — TELEPHONE ENCOUNTER
Patient has reviewed his lab results. Went over 's information regarding  Lab results. Patient had no further questions.   Lynne Key CMA, 06/14/22, 1:34 PM

## 2022-11-11 ENCOUNTER — TELEPHONE (OUTPATIENT)
Dept: FAMILY MEDICINE CLINIC | Facility: CLINIC | Age: 77
End: 2022-11-11

## 2022-11-11 RX ORDER — AMOXICILLIN 500 MG/1
500 CAPSULE ORAL 3 TIMES DAILY
Qty: 30 CAPSULE | Refills: 0 | Status: SHIPPED | OUTPATIENT
Start: 2022-11-11 | End: 2022-11-21

## 2022-11-11 NOTE — TELEPHONE ENCOUNTER
Patient states he has his yearly cough. No fever. Just a dry cough. Tried Mucinex. Patient states he has been given Amoxicillin in the past for this cough. Requesting Amoxicillin Rx sent to Pharmacy. Please advise.

## 2022-11-11 NOTE — TELEPHONE ENCOUNTER
Pt calling seeking appt with PCP for a cough that he gets every year. He said he is normally prescribed amoxacillin and would like that again. Please advise.

## 2023-08-04 ENCOUNTER — OFFICE VISIT (OUTPATIENT)
Dept: FAMILY MEDICINE CLINIC | Facility: CLINIC | Age: 78
End: 2023-08-04
Payer: MEDICARE

## 2023-08-04 VITALS
DIASTOLIC BLOOD PRESSURE: 72 MMHG | WEIGHT: 185.19 LBS | TEMPERATURE: 97 F | HEIGHT: 69 IN | BODY MASS INDEX: 27.43 KG/M2 | OXYGEN SATURATION: 95 % | HEART RATE: 74 BPM | RESPIRATION RATE: 18 BRPM | SYSTOLIC BLOOD PRESSURE: 116 MMHG

## 2023-08-04 DIAGNOSIS — I35.9 AORTIC VALVE DISORDER: ICD-10-CM

## 2023-08-04 DIAGNOSIS — N18.31 STAGE 3A CHRONIC KIDNEY DISEASE (HCC): ICD-10-CM

## 2023-08-04 DIAGNOSIS — R97.20 BPH WITH ELEVATED PSA: ICD-10-CM

## 2023-08-04 DIAGNOSIS — N40.0 BPH WITH ELEVATED PSA: ICD-10-CM

## 2023-08-04 DIAGNOSIS — I77.810 ASCENDING AORTA DILATATION (HCC): ICD-10-CM

## 2023-08-04 DIAGNOSIS — R91.1 LUNG NODULE: ICD-10-CM

## 2023-08-04 DIAGNOSIS — I35.1 MILD AORTIC INSUFFICIENCY: ICD-10-CM

## 2023-08-04 DIAGNOSIS — Z00.00 ENCOUNTER FOR ANNUAL HEALTH EXAMINATION: Primary | ICD-10-CM

## 2023-08-04 DIAGNOSIS — M15.9 PRIMARY OSTEOARTHRITIS INVOLVING MULTIPLE JOINTS: ICD-10-CM

## 2023-08-04 DIAGNOSIS — Z79.01 LONG TERM (CURRENT) USE OF ANTICOAGULANTS: ICD-10-CM

## 2023-08-04 DIAGNOSIS — Z86.718 HISTORY OF DVT (DEEP VEIN THROMBOSIS): ICD-10-CM

## 2023-08-04 DIAGNOSIS — I05.9 MITRAL VALVE DISEASE: ICD-10-CM

## 2023-08-04 DIAGNOSIS — Z90.5 ACQUIRED ABSENCE OF KIDNEY: ICD-10-CM

## 2023-08-04 DIAGNOSIS — G89.29 CHRONIC MIDLINE LOW BACK PAIN WITHOUT SCIATICA: ICD-10-CM

## 2023-08-04 DIAGNOSIS — Z90.81 HX OF SPLENECTOMY: ICD-10-CM

## 2023-08-04 DIAGNOSIS — Z98.890 STATUS POST MITRAL VALVE REPAIR: ICD-10-CM

## 2023-08-04 DIAGNOSIS — Z85.820 HISTORY OF MELANOMA: ICD-10-CM

## 2023-08-04 DIAGNOSIS — Z90.2 S/P LOBECTOMY OF LUNG: ICD-10-CM

## 2023-08-04 DIAGNOSIS — M54.50 CHRONIC MIDLINE LOW BACK PAIN WITHOUT SCIATICA: ICD-10-CM

## 2023-08-04 DIAGNOSIS — R33.9 INCOMPLETE BLADDER EMPTYING: ICD-10-CM

## 2023-08-04 DIAGNOSIS — I48.21 PERMANENT ATRIAL FIBRILLATION (HCC): ICD-10-CM

## 2023-08-04 PROCEDURE — 1126F AMNT PAIN NOTED NONE PRSNT: CPT | Performed by: FAMILY MEDICINE

## 2023-08-04 PROCEDURE — G0439 PPPS, SUBSEQ VISIT: HCPCS | Performed by: FAMILY MEDICINE

## 2023-08-04 PROCEDURE — 3074F SYST BP LT 130 MM HG: CPT | Performed by: FAMILY MEDICINE

## 2023-08-04 PROCEDURE — 1159F MED LIST DOCD IN RCRD: CPT | Performed by: FAMILY MEDICINE

## 2023-08-04 PROCEDURE — 3078F DIAST BP <80 MM HG: CPT | Performed by: FAMILY MEDICINE

## 2023-08-04 PROCEDURE — 99213 OFFICE O/P EST LOW 20 MIN: CPT | Performed by: FAMILY MEDICINE

## 2023-08-04 PROCEDURE — 1170F FXNL STATUS ASSESSED: CPT | Performed by: FAMILY MEDICINE

## 2023-08-04 PROCEDURE — 96160 PT-FOCUSED HLTH RISK ASSMT: CPT | Performed by: FAMILY MEDICINE

## 2023-08-04 PROCEDURE — 1160F RVW MEDS BY RX/DR IN RCRD: CPT | Performed by: FAMILY MEDICINE

## 2023-08-04 PROCEDURE — 3008F BODY MASS INDEX DOCD: CPT | Performed by: FAMILY MEDICINE
